# Patient Record
Sex: MALE | Race: WHITE | NOT HISPANIC OR LATINO
[De-identification: names, ages, dates, MRNs, and addresses within clinical notes are randomized per-mention and may not be internally consistent; named-entity substitution may affect disease eponyms.]

---

## 2018-10-05 PROBLEM — Z00.00 ENCOUNTER FOR PREVENTIVE HEALTH EXAMINATION: Status: ACTIVE | Noted: 2018-10-05

## 2018-10-06 ENCOUNTER — RX RENEWAL (OUTPATIENT)
Age: 33
End: 2018-10-06

## 2018-10-06 ENCOUNTER — APPOINTMENT (OUTPATIENT)
Dept: INTERNAL MEDICINE | Facility: CLINIC | Age: 33
End: 2018-10-06
Payer: COMMERCIAL

## 2018-10-06 ENCOUNTER — LABORATORY RESULT (OUTPATIENT)
Age: 33
End: 2018-10-06

## 2018-10-06 VITALS
DIASTOLIC BLOOD PRESSURE: 80 MMHG | WEIGHT: 210 LBS | HEART RATE: 77 BPM | SYSTOLIC BLOOD PRESSURE: 120 MMHG | TEMPERATURE: 98.4 F | HEIGHT: 69 IN | OXYGEN SATURATION: 98 % | BODY MASS INDEX: 31.1 KG/M2

## 2018-10-06 DIAGNOSIS — F17.200 NICOTINE DEPENDENCE, UNSPECIFIED, UNCOMPLICATED: ICD-10-CM

## 2018-10-06 DIAGNOSIS — Z78.9 OTHER SPECIFIED HEALTH STATUS: ICD-10-CM

## 2018-10-06 DIAGNOSIS — J30.1 ALLERGIC RHINITIS DUE TO POLLEN: ICD-10-CM

## 2018-10-06 DIAGNOSIS — Z82.49 FAMILY HISTORY OF ISCHEMIC HEART DISEASE AND OTHER DISEASES OF THE CIRCULATORY SYSTEM: ICD-10-CM

## 2018-10-06 DIAGNOSIS — F10.230 ALCOHOL DEPENDENCE WITH WITHDRAWAL, UNCOMPLICATED: ICD-10-CM

## 2018-10-06 PROCEDURE — 36415 COLL VENOUS BLD VENIPUNCTURE: CPT

## 2018-10-06 PROCEDURE — 99203 OFFICE O/P NEW LOW 30 MIN: CPT | Mod: 25

## 2018-10-06 RX ORDER — LORAZEPAM 1 MG/1
1 TABLET ORAL
Qty: 15 | Refills: 0 | Status: DISCONTINUED | COMMUNITY
Start: 2018-10-06 | End: 2018-10-06

## 2018-10-06 NOTE — PLAN
[FreeTextEntry1] : Gave pt written instructions on how to taper lorazepam\par Follow up at [facility] outpatient program.

## 2018-10-06 NOTE — HISTORY OF PRESENT ILLNESS
[FreeTextEntry8] : Pt referred from [facility] drinking 6-15 drinks daily, one shot per drink, for the past 2 weeks. Prior to 2 weeks ago, pt was drinking every other day for about 6 weeks. Prior to 2 months ago, pt was sober for 1 year. Pt has never been in Detox or Rehab. Pt had initial evaluation at [facility] yesterday,

## 2018-10-06 NOTE — PHYSICAL EXAM

## 2018-10-10 LAB
ALBUMIN SERPL ELPH-MCNC: 4.7 G/DL
ALP BLD-CCNC: 95 U/L
ALT SERPL-CCNC: 31 U/L
ANION GAP SERPL CALC-SCNC: 13 MMOL/L
APPEARANCE: ABNORMAL
AST SERPL-CCNC: 24 U/L
BASOPHILS # BLD AUTO: 0.05 K/UL
BASOPHILS NFR BLD AUTO: 0.8 %
BILIRUB SERPL-MCNC: 0.4 MG/DL
BILIRUBIN URINE: NEGATIVE
BLOOD URINE: NEGATIVE
BUN SERPL-MCNC: 14 MG/DL
CALCIUM SERPL-MCNC: 9.5 MG/DL
CHLORIDE SERPL-SCNC: 101 MMOL/L
CHOLEST SERPL-MCNC: 218 MG/DL
CHOLEST/HDLC SERPL: 5.1 RATIO
CO2 SERPL-SCNC: 26 MMOL/L
COLOR: YELLOW
CREAT SERPL-MCNC: 1.09 MG/DL
EOSINOPHIL # BLD AUTO: 0.31 K/UL
EOSINOPHIL NFR BLD AUTO: 4.9 %
ESTIMATED AVERAGE GLUCOSE: 111 MG/DL
FOLATE SERPL-MCNC: 9.1 NG/ML
GLUCOSE QUALITATIVE U: NEGATIVE MG/DL
GLUCOSE SERPL-MCNC: 98 MG/DL
HBA1C MFR BLD HPLC: 5.5 %
HCT VFR BLD CALC: 44.2 %
HDLC SERPL-MCNC: 43 MG/DL
HGB BLD-MCNC: 14.2 G/DL
IMM GRANULOCYTES NFR BLD AUTO: 0.2 %
KETONES URINE: NEGATIVE
LDLC SERPL CALC-MCNC: 138 MG/DL
LEUKOCYTE ESTERASE URINE: NEGATIVE
LYMPHOCYTES # BLD AUTO: 2.07 K/UL
LYMPHOCYTES NFR BLD AUTO: 32.8 %
M TB IFN-G BLD-IMP: NEGATIVE
MAN DIFF?: NORMAL
MCHC RBC-ENTMCNC: 28.5 PG
MCHC RBC-ENTMCNC: 32.1 GM/DL
MCV RBC AUTO: 88.8 FL
MONOCYTES # BLD AUTO: 0.45 K/UL
MONOCYTES NFR BLD AUTO: 7.1 %
NEUTROPHILS # BLD AUTO: 3.43 K/UL
NEUTROPHILS NFR BLD AUTO: 54.2 %
NITRITE URINE: NEGATIVE
PH URINE: 7
PLATELET # BLD AUTO: 322 K/UL
POTASSIUM SERPL-SCNC: 4.3 MMOL/L
PROT SERPL-MCNC: 7.1 G/DL
PROTEIN URINE: NEGATIVE MG/DL
PSA SERPL-MCNC: 0.35 NG/ML
QUANTIFERON TB PLUS MITOGEN MINUS NIL: >10 IU/ML
QUANTIFERON TB PLUS NIL: 0.03 IU/ML
QUANTIFERON TB PLUS TB1 MINUS NIL: -0.01 IU/ML
QUANTIFERON TB PLUS TB2 MINUS NIL: -0.01 IU/ML
RBC # BLD: 4.98 M/UL
RBC # FLD: 13.7 %
SODIUM SERPL-SCNC: 140 MMOL/L
SPECIFIC GRAVITY URINE: 1.03
TRIGL SERPL-MCNC: 186 MG/DL
TSH SERPL-ACNC: 3.93 UIU/ML
UROBILINOGEN URINE: NEGATIVE MG/DL
VIT B12 SERPL-MCNC: 705 PG/ML
WBC # FLD AUTO: 6.32 K/UL

## 2019-01-09 ENCOUNTER — APPOINTMENT (OUTPATIENT)
Dept: INTERNAL MEDICINE | Facility: CLINIC | Age: 34
End: 2019-01-09
Payer: COMMERCIAL

## 2019-01-09 VITALS
TEMPERATURE: 97.9 F | BODY MASS INDEX: 32.44 KG/M2 | DIASTOLIC BLOOD PRESSURE: 80 MMHG | SYSTOLIC BLOOD PRESSURE: 128 MMHG | HEIGHT: 69 IN | WEIGHT: 219 LBS

## 2019-01-09 DIAGNOSIS — F10.20 ALCOHOL DEPENDENCE, UNCOMPLICATED: ICD-10-CM

## 2019-01-09 DIAGNOSIS — F41.8 OTHER SPECIFIED ANXIETY DISORDERS: ICD-10-CM

## 2019-01-09 PROCEDURE — 99214 OFFICE O/P EST MOD 30 MIN: CPT

## 2019-01-09 NOTE — REVIEW OF SYSTEMS
[Insomnia] : insomnia [Anxiety] : anxiety [Depression] : depression [Negative] : Neurological [Suicidal] : not suicidal

## 2019-01-09 NOTE — PHYSICAL EXAM
[No Acute Distress] : no acute distress [Normal Sclera/Conjunctiva] : normal sclera/conjunctiva [Normal Outer Ear/Nose] : the outer ears and nose were normal in appearance [Normal Gait] : normal gait [No Focal Deficits] : no focal deficits [Normal Affect] : the affect was normal [Alert and Oriented x3] : oriented to person, place, and time [Normal Insight/Judgement] : insight and judgment were intact

## 2019-01-09 NOTE — HISTORY OF PRESENT ILLNESS
[FreeTextEntry8] : He started drinking again about 3 months ago. He wants to try antabuse. He was able to quit before by going to AA. He wants to stop for a year which he did and then just restarted. he thinks that it is worsened by depression and anxiety. he is seeing a therapist. He has good days and bad days. overall he lacks motivation. his sleep is variable. His wife notices the changes in him

## 2019-01-09 NOTE — ASSESSMENT
[FreeTextEntry1] : have referred him to the [facility] re: the antabuse. he should reconsider going back to AA. Will start him on lexapro. he will continue to see his therapist\par return 4-6 weeks

## 2019-01-09 NOTE — HEALTH RISK ASSESSMENT
[3] : 1) Little interest or pleasure doing things for nearly every day (3) [2] : 2) Feeling down, depressed, or hopeless for more than half of the days (2) [] : No [[id number]] : 5

## 2019-01-22 ENCOUNTER — CLINICAL ADVICE (OUTPATIENT)
Age: 34
End: 2019-01-22

## 2020-01-03 ENCOUNTER — APPOINTMENT (OUTPATIENT)
Dept: INTERNAL MEDICINE | Facility: CLINIC | Age: 35
End: 2020-01-03

## 2022-12-02 ENCOUNTER — APPOINTMENT (OUTPATIENT)
Dept: INTERNAL MEDICINE | Facility: CLINIC | Age: 37
End: 2022-12-02

## 2023-03-14 ENCOUNTER — INPATIENT (INPATIENT)
Facility: HOSPITAL | Age: 38
LOS: 6 days | Discharge: HOME | DRG: 897 | End: 2023-03-21
Attending: HOSPITALIST | Admitting: HOSPITALIST
Payer: COMMERCIAL

## 2023-03-14 VITALS
RESPIRATION RATE: 20 BRPM | SYSTOLIC BLOOD PRESSURE: 144 MMHG | HEART RATE: 111 BPM | WEIGHT: 235.01 LBS | TEMPERATURE: 99 F | HEIGHT: 69 IN | DIASTOLIC BLOOD PRESSURE: 93 MMHG

## 2023-03-14 DIAGNOSIS — F10.239 ALCOHOL DEPENDENCE WITH WITHDRAWAL, UNSPECIFIED: ICD-10-CM

## 2023-03-14 DIAGNOSIS — Z29.9 ENCOUNTER FOR PROPHYLACTIC MEASURES, UNSPECIFIED: ICD-10-CM

## 2023-03-14 DIAGNOSIS — R19.7 DIARRHEA, UNSPECIFIED: ICD-10-CM

## 2023-03-14 DIAGNOSIS — F41.9 ANXIETY DISORDER, UNSPECIFIED: ICD-10-CM

## 2023-03-14 LAB
ALBUMIN SERPL ELPH-MCNC: 4.1 G/DL — SIGNIFICANT CHANGE UP (ref 3.3–5)
ALBUMIN SERPL ELPH-MCNC: 4.3 G/DL — SIGNIFICANT CHANGE UP (ref 3.3–5)
ALP SERPL-CCNC: 207 U/L — HIGH (ref 40–120)
ALP SERPL-CCNC: 210 U/L — HIGH (ref 40–120)
ALT FLD-CCNC: 100 U/L — HIGH (ref 10–45)
ALT FLD-CCNC: 95 U/L — HIGH (ref 10–45)
AMPHET UR-MCNC: NEGATIVE — SIGNIFICANT CHANGE UP
ANION GAP SERPL CALC-SCNC: 13 MMOL/L — SIGNIFICANT CHANGE UP (ref 5–17)
ANION GAP SERPL CALC-SCNC: 15 MMOL/L — SIGNIFICANT CHANGE UP (ref 5–17)
ANISOCYTOSIS BLD QL: SLIGHT — SIGNIFICANT CHANGE UP
APAP SERPL-MCNC: <15 UG/ML — SIGNIFICANT CHANGE UP (ref 10–30)
APPEARANCE UR: ABNORMAL
APTT BLD: 35.9 SEC — HIGH (ref 27.5–35.5)
AST SERPL-CCNC: 211 U/L — HIGH (ref 10–40)
AST SERPL-CCNC: 212 U/L — HIGH (ref 10–40)
BACTERIA # UR AUTO: NEGATIVE — SIGNIFICANT CHANGE UP
BARBITURATES UR SCN-MCNC: NEGATIVE — SIGNIFICANT CHANGE UP
BASOPHILS # BLD AUTO: 0 K/UL — SIGNIFICANT CHANGE UP (ref 0–0.2)
BASOPHILS NFR BLD AUTO: 0 % — SIGNIFICANT CHANGE UP (ref 0–2)
BENZODIAZ UR-MCNC: NEGATIVE — SIGNIFICANT CHANGE UP
BILIRUB DIRECT SERPL-MCNC: 1.1 MG/DL — HIGH (ref 0–0.3)
BILIRUB INDIRECT FLD-MCNC: 1.6 MG/DL — HIGH (ref 0.2–1)
BILIRUB SERPL-MCNC: 2.7 MG/DL — HIGH (ref 0.2–1.2)
BILIRUB SERPL-MCNC: 3.7 MG/DL — HIGH (ref 0.2–1.2)
BILIRUB UR-MCNC: ABNORMAL
BUN SERPL-MCNC: 5 MG/DL — LOW (ref 7–23)
BUN SERPL-MCNC: <4 MG/DL — LOW (ref 7–23)
CALCIUM SERPL-MCNC: 9.4 MG/DL — SIGNIFICANT CHANGE UP (ref 8.4–10.5)
CALCIUM SERPL-MCNC: 9.5 MG/DL — SIGNIFICANT CHANGE UP (ref 8.4–10.5)
CHLORIDE SERPL-SCNC: 95 MMOL/L — LOW (ref 96–108)
CHLORIDE SERPL-SCNC: 97 MMOL/L — SIGNIFICANT CHANGE UP (ref 96–108)
CO2 SERPL-SCNC: 23 MMOL/L — SIGNIFICANT CHANGE UP (ref 22–31)
CO2 SERPL-SCNC: 26 MMOL/L — SIGNIFICANT CHANGE UP (ref 22–31)
COCAINE METAB.OTHER UR-MCNC: NEGATIVE — SIGNIFICANT CHANGE UP
COLOR SPEC: ABNORMAL
CREAT SERPL-MCNC: 0.7 MG/DL — SIGNIFICANT CHANGE UP (ref 0.5–1.3)
CREAT SERPL-MCNC: 0.8 MG/DL — SIGNIFICANT CHANGE UP (ref 0.5–1.3)
DIFF PNL FLD: NEGATIVE — SIGNIFICANT CHANGE UP
EGFR: 118 ML/MIN/1.73M2 — SIGNIFICANT CHANGE UP
EGFR: 122 ML/MIN/1.73M2 — SIGNIFICANT CHANGE UP
EOSINOPHIL # BLD AUTO: 0.14 K/UL — SIGNIFICANT CHANGE UP (ref 0–0.5)
EOSINOPHIL NFR BLD AUTO: 0.9 % — SIGNIFICANT CHANGE UP (ref 0–6)
EPI CELLS # UR: 1 /HPF — SIGNIFICANT CHANGE UP
ETHANOL SERPL-MCNC: 14 MG/DL — HIGH (ref 0–10)
GI PCR PANEL: SIGNIFICANT CHANGE UP
GLUCOSE SERPL-MCNC: 110 MG/DL — HIGH (ref 70–99)
GLUCOSE SERPL-MCNC: 88 MG/DL — SIGNIFICANT CHANGE UP (ref 70–99)
GLUCOSE UR QL: NEGATIVE — SIGNIFICANT CHANGE UP
HCT VFR BLD CALC: 42.8 % — SIGNIFICANT CHANGE UP (ref 39–50)
HCT VFR BLD CALC: 45.1 % — SIGNIFICANT CHANGE UP (ref 39–50)
HGB BLD-MCNC: 14.5 G/DL — SIGNIFICANT CHANGE UP (ref 13–17)
HGB BLD-MCNC: 15 G/DL — SIGNIFICANT CHANGE UP (ref 13–17)
HYALINE CASTS # UR AUTO: ABNORMAL /LPF
INR BLD: 1.56 RATIO — HIGH (ref 0.88–1.16)
KETONES UR-MCNC: SIGNIFICANT CHANGE UP
LEUKOCYTE ESTERASE UR-ACNC: NEGATIVE — SIGNIFICANT CHANGE UP
LYMPHOCYTES # BLD AUTO: 13.9 % — SIGNIFICANT CHANGE UP (ref 13–44)
LYMPHOCYTES # BLD AUTO: 2.13 K/UL — SIGNIFICANT CHANGE UP (ref 1–3.3)
MACROCYTES BLD QL: SLIGHT — SIGNIFICANT CHANGE UP
MAGNESIUM SERPL-MCNC: 1.7 MG/DL — SIGNIFICANT CHANGE UP (ref 1.6–2.6)
MANUAL SMEAR VERIFICATION: SIGNIFICANT CHANGE UP
MCHC RBC-ENTMCNC: 32.4 PG — SIGNIFICANT CHANGE UP (ref 27–34)
MCHC RBC-ENTMCNC: 32.7 PG — SIGNIFICANT CHANGE UP (ref 27–34)
MCHC RBC-ENTMCNC: 33.3 GM/DL — SIGNIFICANT CHANGE UP (ref 32–36)
MCHC RBC-ENTMCNC: 33.9 GM/DL — SIGNIFICANT CHANGE UP (ref 32–36)
MCV RBC AUTO: 96.6 FL — SIGNIFICANT CHANGE UP (ref 80–100)
MCV RBC AUTO: 97.4 FL — SIGNIFICANT CHANGE UP (ref 80–100)
METHADONE UR-MCNC: NEGATIVE — SIGNIFICANT CHANGE UP
MONOCYTES # BLD AUTO: 1.99 K/UL — HIGH (ref 0–0.9)
MONOCYTES NFR BLD AUTO: 13 % — SIGNIFICANT CHANGE UP (ref 2–14)
NEUTROPHILS # BLD AUTO: 11.08 K/UL — HIGH (ref 1.8–7.4)
NEUTROPHILS NFR BLD AUTO: 68.7 % — SIGNIFICANT CHANGE UP (ref 43–77)
NEUTS BAND # BLD: 3.5 % — SIGNIFICANT CHANGE UP (ref 0–8)
NITRITE UR-MCNC: NEGATIVE — SIGNIFICANT CHANGE UP
NRBC # BLD: 0 /100 WBCS — SIGNIFICANT CHANGE UP (ref 0–0)
OPIATES UR-MCNC: NEGATIVE — SIGNIFICANT CHANGE UP
OVALOCYTES BLD QL SMEAR: SLIGHT — SIGNIFICANT CHANGE UP
OXYCODONE UR-MCNC: NEGATIVE — SIGNIFICANT CHANGE UP
PCP SPEC-MCNC: SIGNIFICANT CHANGE UP
PCP UR-MCNC: NEGATIVE — SIGNIFICANT CHANGE UP
PH UR: 6.5 — SIGNIFICANT CHANGE UP (ref 5–8)
PHOSPHATE SERPL-MCNC: 3.3 MG/DL — SIGNIFICANT CHANGE UP (ref 2.5–4.5)
PLAT MORPH BLD: NORMAL — SIGNIFICANT CHANGE UP
PLATELET # BLD AUTO: 265 K/UL — SIGNIFICANT CHANGE UP (ref 150–400)
PLATELET # BLD AUTO: 273 K/UL — SIGNIFICANT CHANGE UP (ref 150–400)
PLATELET COUNT - ESTIMATE: NORMAL — SIGNIFICANT CHANGE UP
POLYCHROMASIA BLD QL SMEAR: SLIGHT — SIGNIFICANT CHANGE UP
POTASSIUM SERPL-MCNC: 3.8 MMOL/L — SIGNIFICANT CHANGE UP (ref 3.5–5.3)
POTASSIUM SERPL-MCNC: 4.1 MMOL/L — SIGNIFICANT CHANGE UP (ref 3.5–5.3)
POTASSIUM SERPL-SCNC: 3.8 MMOL/L — SIGNIFICANT CHANGE UP (ref 3.5–5.3)
POTASSIUM SERPL-SCNC: 4.1 MMOL/L — SIGNIFICANT CHANGE UP (ref 3.5–5.3)
PROT SERPL-MCNC: 7.7 G/DL — SIGNIFICANT CHANGE UP (ref 6–8.3)
PROT SERPL-MCNC: 8.1 G/DL — SIGNIFICANT CHANGE UP (ref 6–8.3)
PROT UR-MCNC: ABNORMAL
PROTHROM AB SERPL-ACNC: 18 SEC — HIGH (ref 10.5–13.4)
RBC # BLD: 4.43 M/UL — SIGNIFICANT CHANGE UP (ref 4.2–5.8)
RBC # BLD: 4.63 M/UL — SIGNIFICANT CHANGE UP (ref 4.2–5.8)
RBC # FLD: 14.6 % — HIGH (ref 10.3–14.5)
RBC # FLD: 14.7 % — HIGH (ref 10.3–14.5)
RBC BLD AUTO: SIGNIFICANT CHANGE UP
RBC CASTS # UR COMP ASSIST: 1 /HPF — SIGNIFICANT CHANGE UP (ref 0–4)
SALICYLATES SERPL-MCNC: <2 MG/DL — LOW (ref 15–30)
SARS-COV-2 RNA SPEC QL NAA+PROBE: SIGNIFICANT CHANGE UP
SODIUM SERPL-SCNC: 134 MMOL/L — LOW (ref 135–145)
SODIUM SERPL-SCNC: 135 MMOL/L — SIGNIFICANT CHANGE UP (ref 135–145)
SP GR SPEC: 1.03 — HIGH (ref 1.01–1.02)
TARGETS BLD QL SMEAR: SLIGHT — SIGNIFICANT CHANGE UP
THC UR QL: NEGATIVE — SIGNIFICANT CHANGE UP
TSH SERPL-MCNC: 8.28 UIU/ML — HIGH (ref 0.27–4.2)
UROBILINOGEN FLD QL: ABNORMAL
WBC # BLD: 13.41 K/UL — HIGH (ref 3.8–10.5)
WBC # BLD: 15.34 K/UL — HIGH (ref 3.8–10.5)
WBC # FLD AUTO: 13.41 K/UL — HIGH (ref 3.8–10.5)
WBC # FLD AUTO: 15.34 K/UL — HIGH (ref 3.8–10.5)
WBC UR QL: 5 /HPF — SIGNIFICANT CHANGE UP (ref 0–5)

## 2023-03-14 PROCEDURE — 99285 EMERGENCY DEPT VISIT HI MDM: CPT

## 2023-03-14 PROCEDURE — 99222 1ST HOSP IP/OBS MODERATE 55: CPT

## 2023-03-14 RX ORDER — ACETAMINOPHEN 500 MG
650 TABLET ORAL EVERY 6 HOURS
Refills: 0 | Status: DISCONTINUED | OUTPATIENT
Start: 2023-03-14 | End: 2023-03-21

## 2023-03-14 RX ORDER — DIAZEPAM 5 MG
5 TABLET ORAL ONCE
Refills: 0 | Status: DISCONTINUED | OUTPATIENT
Start: 2023-03-14 | End: 2023-03-14

## 2023-03-14 RX ORDER — ONDANSETRON 8 MG/1
4 TABLET, FILM COATED ORAL ONCE
Refills: 0 | Status: COMPLETED | OUTPATIENT
Start: 2023-03-14 | End: 2023-03-14

## 2023-03-14 RX ORDER — ONDANSETRON 8 MG/1
4 TABLET, FILM COATED ORAL EVERY 8 HOURS
Refills: 0 | Status: DISCONTINUED | OUTPATIENT
Start: 2023-03-14 | End: 2023-03-21

## 2023-03-14 RX ORDER — LANOLIN ALCOHOL/MO/W.PET/CERES
3 CREAM (GRAM) TOPICAL AT BEDTIME
Refills: 0 | Status: DISCONTINUED | OUTPATIENT
Start: 2023-03-14 | End: 2023-03-21

## 2023-03-14 RX ORDER — SODIUM CHLORIDE 9 MG/ML
1000 INJECTION, SOLUTION INTRAVENOUS
Refills: 0 | Status: DISCONTINUED | OUTPATIENT
Start: 2023-03-14 | End: 2023-03-20

## 2023-03-14 RX ORDER — AZITHROMYCIN 500 MG/1
500 TABLET, FILM COATED ORAL DAILY
Refills: 0 | Status: DISCONTINUED | OUTPATIENT
Start: 2023-03-14 | End: 2023-03-15

## 2023-03-14 RX ORDER — SODIUM CHLORIDE 9 MG/ML
1000 INJECTION, SOLUTION INTRAVENOUS ONCE
Refills: 0 | Status: COMPLETED | OUTPATIENT
Start: 2023-03-14 | End: 2023-03-14

## 2023-03-14 RX ORDER — FOLIC ACID 0.8 MG
1 TABLET ORAL DAILY
Refills: 0 | Status: DISCONTINUED | OUTPATIENT
Start: 2023-03-14 | End: 2023-03-21

## 2023-03-14 RX ORDER — THIAMINE MONONITRATE (VIT B1) 100 MG
500 TABLET ORAL DAILY
Refills: 0 | Status: COMPLETED | OUTPATIENT
Start: 2023-03-15 | End: 2023-03-16

## 2023-03-14 RX ORDER — NICOTINE POLACRILEX 2 MG
2 GUM BUCCAL
Refills: 0 | Status: DISCONTINUED | OUTPATIENT
Start: 2023-03-14 | End: 2023-03-21

## 2023-03-14 RX ORDER — NICOTINE POLACRILEX 2 MG
1 GUM BUCCAL DAILY
Refills: 0 | Status: DISCONTINUED | OUTPATIENT
Start: 2023-03-14 | End: 2023-03-21

## 2023-03-14 RX ORDER — INFLUENZA VIRUS VACCINE 15; 15; 15; 15 UG/.5ML; UG/.5ML; UG/.5ML; UG/.5ML
0.5 SUSPENSION INTRAMUSCULAR ONCE
Refills: 0 | Status: DISCONTINUED | OUTPATIENT
Start: 2023-03-14 | End: 2023-03-21

## 2023-03-14 RX ORDER — THIAMINE MONONITRATE (VIT B1) 100 MG
500 TABLET ORAL ONCE
Refills: 0 | Status: COMPLETED | OUTPATIENT
Start: 2023-03-14 | End: 2023-03-14

## 2023-03-14 RX ADMIN — Medication 1 MILLIGRAM(S): at 14:06

## 2023-03-14 RX ADMIN — ONDANSETRON 4 MILLIGRAM(S): 8 TABLET, FILM COATED ORAL at 05:55

## 2023-03-14 RX ADMIN — Medication 2 MILLIGRAM(S): at 13:02

## 2023-03-14 RX ADMIN — Medication 4 MILLIGRAM(S): at 21:22

## 2023-03-14 RX ADMIN — Medication 2 MILLIGRAM(S): at 14:17

## 2023-03-14 RX ADMIN — AZITHROMYCIN 500 MILLIGRAM(S): 500 TABLET, FILM COATED ORAL at 14:06

## 2023-03-14 RX ADMIN — Medication 1 PATCH: at 19:09

## 2023-03-14 RX ADMIN — Medication 1 PATCH: at 14:05

## 2023-03-14 RX ADMIN — ONDANSETRON 4 MILLIGRAM(S): 8 TABLET, FILM COATED ORAL at 08:56

## 2023-03-14 RX ADMIN — Medication 105 MILLIGRAM(S): at 05:22

## 2023-03-14 RX ADMIN — Medication 5 MILLIGRAM(S): at 05:58

## 2023-03-14 RX ADMIN — SODIUM CHLORIDE 1000 MILLILITER(S): 9 INJECTION, SOLUTION INTRAVENOUS at 04:04

## 2023-03-14 RX ADMIN — SODIUM CHLORIDE 100 MILLILITER(S): 9 INJECTION, SOLUTION INTRAVENOUS at 21:23

## 2023-03-14 RX ADMIN — Medication 4 MILLIGRAM(S): at 17:33

## 2023-03-14 RX ADMIN — Medication 5 MILLIGRAM(S): at 04:05

## 2023-03-14 RX ADMIN — SODIUM CHLORIDE 100 MILLILITER(S): 9 INJECTION, SOLUTION INTRAVENOUS at 14:03

## 2023-03-14 NOTE — ED PROVIDER NOTE - ATTENDING CONTRIBUTION TO CARE
37-year-old with history of anxiety and alcohol use disorder comes in secondary to anxiety and waking throughout the night and having difficulty sleeping secondary to his anxiety.  Patient states his last drink was yesterday around 4 PM.  Patient usually drinks 500 to 600 mL of vodka each day.  Patient without gross SI or HI but states when he wakes up throughout the night with his anxiety at times he thinks about SI without any plan.    Anxious  Moderate distress secondary to situation  Tachycardic  Nontachypneic  Soft, no tenderness no rebound or guarding   no rash petechiae or vesicles  Moisés Sheikh MD, FACEP: In this physician's medical judgement based on clinical history and physical exam the patient's signs and symptoms lead to differential diagnoses which includes but is not limited to: Alcohol withdrawal, anxiety, stress reaction    Historical signs and symptoms not consistent with derek, paranoia, suicidal homicidal intent.    Labs were ordered and independently reviewed by me.  EKG was ordered and independently reviewed by me.  Imaging was ordered and reviewed by me.    Appropriate medications for the patient's presenting complaints were ordered, and effects were reassessed.    Patient's records including prior hospital visit, med and medical history were reviewed.    Escalation to admission/observation was considered.    Will follow up on labs, therapeutics, imaging, reassess and disposition as clinically indicated.  *The above represents an initial assessment/impression. Please refer to my progress notes below for potential changes in patient clinical course*

## 2023-03-14 NOTE — H&P ADULT - HISTORY OF PRESENT ILLNESS
37M w/pmh alcohol use disorder and anxiety presents to [facility] for alcohol withdrawal. He has been drinking 500-600ml vodka daily for the past 6 months. He was sober prior to this, where he had a work trip which started the drinking again. He has a history of alcohol overdose where he presented to the hospital before but has never been admitted to the ICU or had a withdrawal seizure. He currently endorses severe anxiety, insomnia. He drinks to try to get himself to fall asleep. He wakes up at night with what sound like panic attacks. He currently denies suicidal ideation but has felt it before. He was supposed to go to a rehab facility in [place] sometime this week, pending bed availability. Last drink 3/13 ~4pm.    Patient also has been having diarrhea, about 6 episodes so far today, which started last night. Before that he was having nausea and vomiting for half a week (his wife had the same), not sure what he ate to set off his symptoms. Has not felt febrile. Currently has nausea, last vomited yesterday. The diarrhea is very loose/watery, yellow. No blood.

## 2023-03-14 NOTE — H&P ADULT - PROBLEM SELECTOR PLAN 4
-low improve score, no chemical dvt ppx  -regular diet    Nicotine use disorder - discussed with patient he should quit smoking, he agrees. WIll give nicotine patch and lozenges prn cravings. 7 minutes spent on counseling with patient. -low improve score, no chemical dvt ppx  -regular diet    Nicotine use disorder - discussed with patient he should quit smoking, he agrees. WIll give nicotine patch and lozenges prn cravings. 7 minutes spent on counseling with patient.    Elevated TSH >8 -- check free T4

## 2023-03-14 NOTE — ED BEHAVIORAL HEALTH ASSESSMENT NOTE - HPI (INCLUDE ILLNESS QUALITY, SEVERITY, DURATION, TIMING, CONTEXT, MODIFYING FACTORS, ASSOCIATED SIGNS AND SYMPTOMS)
Patient is a 37 year old male, currently living at home with wife and 2 dogs, working as an  for insurance company [employer] with history of depression, anxiety and alcohol abuse, previously on several SSRIs including fluoxetine (unsure of name of all), lithium - all discontinued now, has seen psychiatrist in the past but stopped a year ago, with PMHx of stomach ulcer (treated with sucralfate), current smoker for the last 15 years, half a pack a day, history of alcohol overdose 6 years ago - quit drinking for 2 years and restart 6 months ago about 500ml - 600ml of vodka everyday.    On interview, patient is cooperative A&Ox4, stating he has been having exacerbated sleeping issues for the past 3 months. Pt reports he's been sleeping for approximately 3 hrs each night and states he feels like he has "racing thoughts" when trying to sleep. Pt also reports feeling depressed and anxious, more so in the last 3 months. Pt states he felt like it was getting to a point "where I couldn't do it anymore" so he came into the ER with parents, and wife. Pt also reports vomiting nearly every day for the last 1.5 weeks. Pt states he has seen psychiatrist in the past but reports "it wasn't a good fit" so he stopped going. Reports being on SSRIs in the past but states after a couple of months, he didn't feel like it was helping so he voluntarily stopped taking SSRIs. Unsure of all names of SSRIs, states he tried fluoxetine and "it didn't work." Reports having reactions to some SSRIs but is unable to recall names. States sleeping has been an issues in the past, tried melatonin with no resolution, also reports taking Trazadone which "helped a little."  Pt admits to struggling with thoughts of "being a failure and not good enough." States this stems from having a image of perfectionism set upon him when he was younger. Reports recent stress at work also exacerbating anxiety, depression and drinking. States he enjoys work but sometimes "goals are not being met" which makes intensifies his struggles of feeling like a failure. States anxiety is worse during 3am - 8am, and more so on Sundays because it's the day before he has to restart working. Reports history of hospitalization due to alcohol overdose 6 years. Pt sates he quit for 2 years and then restarted again 6 months ago, drinking about 500ml - 600ml of vodka daily. Reports last drink being 4pm yesterday. States he has tried quitting smoking in the past (reports using ZYN) but was unsuccessful. Reports not being able to stop drinking and smoking also exacerbating feeling like a failure.     States he has thoughts at night of "it would be better without him." Denies active suicidal ideation, plans or attempts. Denies being a threat to himself or others. Denies access to weapons. Denies safety issues. Denies paranoia. Denies any trauma in the past. Denies family history of depression, bipolar, schizophrenia.     Collateral info: dad, Chris Goetz, present at bedside. [facility] pt was brought in at 1am because he has been restless in the last few days. [facility] pt was voluntarily scheduled to report to [facility] in [place] today "for depression" but ended up coming to the ER because of restlessness. [facility] pt felt anxious last night and was vomiting. Dad was unaware pt restarted drinking alcohol after rehab, found out today after coming to the ED. Dad lives in [place] so he does not see pt every day, who lives at home with wife in [place]. Reports pt went to rehab [facility] in [place] "about 3 years ago."

## 2023-03-14 NOTE — ED BEHAVIORAL HEALTH ASSESSMENT NOTE - DESCRIPTION
see HPI. Ativan 0.5mg q12hrs PRN  Cooperative in the ED upon interview    Vital Signs Last 24 Hrs  T(C): 36.3 (14 Mar 2023 13:05), Max: 37.4 (14 Mar 2023 03:36)  T(F): 97.3 (14 Mar 2023 13:05), Max: 99.3 (14 Mar 2023 03:36)  HR: 102 (14 Mar 2023 15:24) (102 - 116)  BP: 141/90 (14 Mar 2023 15:24) (136/90 - 149/98)  BP(mean): 94 (14 Mar 2023 07:08) (94 - 98)  RR: 17 (14 Mar 2023 15:24) (17 - 20)  SpO2: 96% (14 Mar 2023 15:24) (94% - 96%)    Parameters below as of 14 Mar 2023 15:24  Patient On (Oxygen Delivery Method): room air

## 2023-03-14 NOTE — PATIENT PROFILE ADULT - FALL HARM RISK - HARM RISK INTERVENTIONS

## 2023-03-14 NOTE — ED PROVIDER NOTE - OBJECTIVE STATEMENT
37-year-old male history of self-reported anxiety and EtOH abuse here for anxiety causing him to have SI.  He has had bad anxiety for the last 3 months.  He said in the last 6 months daily 10 drinks of alcohol a day.  Last drink was 4 PM last night.  States that his anxiety started building up and he was afraid he was going to have suicidal thoughts.  He has no plan and no access to firearms.  He has never had a previous attempt.  Currently here at time evaluation he has no SI.  He is requesting to see a psychiatrist because he is on no medications for his anxiety.  Additionally he has no other medical complaints.  He does states that when he is extremely anxious he cannot eat or drink secondary to nausea.

## 2023-03-14 NOTE — ED PROVIDER NOTE - PROGRESS NOTE DETAILS
SATURNINO Abraham (PGY-3) - Labs nonactionable, patient is calm and vital signs have improved.  We will continue to monitor placed on CIWA, continue constant obs.  Called telepsych for consultation and was told to have day team call psych liaison for consultation inpatient.  Discussed with hospitalist.

## 2023-03-14 NOTE — ED PROVIDER NOTE - PHYSICAL EXAMINATION
On examination patient is in no acute distress, skin appears flushed but normothermic, some corrective horizontal nystagmus, slight tremors of extended arms.  Regular rhythm but tacky, clear lungs, nontender abdomen.

## 2023-03-14 NOTE — ED BEHAVIORAL HEALTH ASSESSMENT NOTE - NSBHATTESTCOMMENTATTENDFT_PSY_A_CORE
Patient is a 37 year old man, currently living at home with his wife no children, working as an  for insurance company [employer] with history of depression, anxiety and alcohol abuse, previously on several SSRIs including fluoxetine (unsure of name of all), lithium - all discontinued now, has seen psychiatrist in the past but stopped a year ago, with PMHx of stomach ulcer (treated with sucralfate), current smoker for the last 15 years, half a pack a day, history of alcohol dependence- quit drinking for 2 years and restarted 6 months ago about 500ml - 600ml of vodka everyday.  PT has no thoughts of harming himself now and his parents who later arrived at bedside deny any past suicidal attempts.  Pt is very tremulous and in acute alcohol withdrawal and has a high tolerance to alcohol and benzodiazepines.  He may benefit from having a high dose Ativan 3mg po q3hrs or if LFTS improve Librium 100mg po q4hrs.  Pt and his family plan for inpatient alcohol rehab once he is cleared medically.

## 2023-03-14 NOTE — H&P ADULT - PROBLEM SELECTOR PLAN 1
-add on hepatic functional panel to check LFTs - if elevated will get RUQ US  -ativan taper and prn ativan   -SW consult - patient was scheduled to go to rehab/detox center this week before he got admitted  -500mg thiamine x3 days  -folic acid 1mg qd

## 2023-03-14 NOTE — PATIENT PROFILE ADULT - SW.
[de-identified] : 75 year old male with HTN, CAD s/p CABG, RHD s/p MVR on Coumadin, T2DM, Afib, Rosie Thompson tears and esophageal ulcers here for follow up. \par \par Spoke with pt and daughter over phone. Has not been taking all urology medications as prescribed. Only takes finasteride as be believes it is the only med that works now. Only has to get up once per night to urinate. When he does, he feels like he is emptying his bladder. No dysuria, frequency, hematuria noted. Follows with cardio as well. States he is going to see dentist next week for cleaning.  
social work

## 2023-03-14 NOTE — ED BEHAVIORAL HEALTH ASSESSMENT NOTE - SUMMARY
Patient is a 37 year old male, currently living at home with wife and 2 dogs, working as an  for insurance company [employer] with history of depression, anxiety and alcohol abuse, previously on several SSRIs including fluoxetine (unsure of name of all), lithium - all discontinued now, has seen psychiatrist in the past but stopped a year ago, with PMHx of stomach ulcer (treated with sucralfate), current smoker for the last 15 years, half a pack a day, history of alcohol overdose 6 years ago - quit drinking for 2 years and restart 6 months ago about 500ml - 600ml of vodka everyday.    On interview, patient is cooperative A&Ox4, stating he has been having exacerbated sleeping issues for the past 3 months. Pt reports he's been sleeping for approximately 3 hrs each night and states he feels like he has "racing thoughts" when trying to sleep. Pt also reports feeling depressed and anxious, more so in the last 3 months. Pt states he felt like it was getting to a point "where I couldn't do it anymore" so he came into the ER with parents, and wife. Pt also reports vomiting nearly every day for the last 1.5 weeks. Pt states he has seen psychiatrist in the past but reports "it wasn't a good fit" so he stopped going. Reports being on SSRIs in the past but states after a couple of months, he didn't feel like it was helping so he voluntarily stopped taking SSRIs. Unsure of all names of SSRIs, states he tried fluoxetine and "it didn't work." Reports having reactions to some SSRIs but is unable to recall names. States sleeping has been an issues in the past, tried melatonin with no resolution, also reports taking Trazadone which "helped a little."  Pt admits to struggling with thoughts of "being a failure and not good enough." States this stems from having a image of perfectionism set upon him when he was younger. Reports recent stress at work also exacerbating anxiety, depression and drinking. States he enjoys work but sometimes "goals are not being met" which makes intensifies his struggles of feeling like a failure. States anxiety is worse during 3am - 8am, and more so on Sundays because it's the day before he has to restart working. Reports history of hospitalization due to alcohol overdose 6 years. Pt sates he quit for 2 years and then restarted again 6 months ago, drinking about 500ml - 600ml of vodka daily. Reports last drink being 4pm yesterday. States he has tried quitting smoking in the past (reports using ZYN) but was unsuccessful. Reports not being able to stop drinking and smoking also exacerbating feeling like a failure.     States he has thoughts at night of "it would be better without him." Denies active suicidal ideation, plans or attempts. Denies being a threat to himself or others. Denies access to weapons. Denies safety issues. Denies paranoia. Denies any trauma in the past. Denies family history of depression, bipolar, schizophrenia. Patient is a 37 year old man, currently living at home with his wife and 2 dogs, working as an  for insurance company [employer] with history of depression, anxiety and alcohol abuse, previously on several SSRIs including fluoxetine (unsure of name of all), lithium - all discontinued now, has seen psychiatrist in the past but stopped a year ago, with PMHx of stomach ulcer (treated with sucralfate), current smoker for the last 15 years, half a pack a day, history of alcohol dependence- quit drinking for 2 years and restarted 6 months ago about 500ml - 600ml of vodka everyday.    On interview, patient is cooperative A&Ox4, stating he has been having exacerbated sleeping issues for the past 3 months. Pt reports he's been sleeping for approximately 3 hrs each night and states he feels like he has "racing thoughts" when trying to sleep. Pt also reports feeling depressed and anxious, more so in the last 3 months. Pt states he felt like it was getting to a point "where I couldn't do it anymore" so he came into the ER with parents, and wife. Pt also reports vomiting nearly every day for the last 1.5 weeks. Pt states he has seen psychiatrist in the past but reports "it wasn't a good fit" so he stopped going. Reports being on SSRIs in the past but states after a couple of months, he didn't feel like it was helping so he voluntarily stopped taking SSRIs. Unsure of all names of SSRIs, states he tried fluoxetine and "it didn't work." Reports having reactions to some SSRIs but is unable to recall names. States sleeping has been an issues in the past, tried melatonin with no resolution, also reports taking Trazodone which "helped a little."  Pt admits to struggling with thoughts of "being a failure and not good enough." States this stems from having a image of perfectionism set upon him when he was younger. Reports recent stress at work also exacerbating anxiety, depression and drinking. States he enjoys work but sometimes "goals are not being met" which makes intensifies his struggles of feeling like a failure. States anxiety is worse during 3am - 8am, and more so on Sundays because it's the day before he has to restart working. Reports history of hospitalization due to alcohol overdose 6 years. Pt sates he quit for 2 years and then restarted again 6 months ago, drinking about 500ml - 600ml of vodka daily. Reports last drink being 4pm yesterday. States he has tried quitting smoking in the past (reports using ZYN) but was unsuccessful. Reports not being able to stop drinking and smoking also exacerbating feeling like a failure.     States he has thoughts at night of "it would be better without him." Denies active suicidal ideation, plans or attempts. Denies being a threat to himself or others. Denies access to weapons. Denies safety issues. Denies paranoia. Denies any trauma in the past. Denies family history of depression, bipolar, schizophrenia.

## 2023-03-14 NOTE — ED ADULT TRIAGE NOTE - CHIEF COMPLAINT QUOTE
anxiety for several months. SI ideation. anxiety for several months. SI ideation. Hx of alcohol abuse.

## 2023-03-14 NOTE — H&P ADULT - NSHPPHYSICALEXAM_GEN_ALL_CORE
Vital Signs Last 24 Hrs  T(C): 36.3 (14 Mar 2023 13:05), Max: 37.4 (14 Mar 2023 03:36)  T(F): 97.3 (14 Mar 2023 13:05), Max: 99.3 (14 Mar 2023 03:36)  HR: 104 (14 Mar 2023 13:05) (102 - 116)  BP: 144/92 (14 Mar 2023 13:05) (136/90 - 149/98)  BP(mean): 94 (14 Mar 2023 07:08) (94 - 98)  RR: 19 (14 Mar 2023 13:05) (17 - 20)  SpO2: 95% (14 Mar 2023 13:05) (94% - 96%)    Parameters below as of 14 Mar 2023 13:05  Patient On (Oxygen Delivery Method): room air        CONSTITUTIONAL: Well-groomed, in no apparent distress  EYES: No conjunctival or scleral injection, non-icteric; PERRLA and symmetric  ENMT: No external nasal lesions; no pharyngeal injection or exudates, oral mucosa with moist membranes  NECK: Trachea midline without palpable neck mass; thyroid not enlarged and non-tender  RESPIRATORY: Breathing comfortably; lungs CTA without wheeze/rhonchi/rales  CARDIOVASCULAR: +S1S2, RRR, no M/G/R; pedal pulses full and symmetric; no lower extremity edema  GASTROINTESTINAL: No palpable masses or tenderness, +BS throughout, no rebound/guarding; no hepatosplenomegaly; +distended  MUSCULOSKELETAL: no digital clubbing or cyanosis; no paraspinal tenderness; normal strength and tone of extremities  SKIN: No rashes or ulcers noted; no subcutaneous nodules or induration palpable  NEUROLOGIC: CN II-XII intact; sensation intact in LEs b/l to light touch  PSYCHIATRIC: A+O x 3; mood and affect appropriate; appropriate insight and judgment Vital Signs Last 24 Hrs  T(C): 36.3 (14 Mar 2023 13:05), Max: 37.4 (14 Mar 2023 03:36)  T(F): 97.3 (14 Mar 2023 13:05), Max: 99.3 (14 Mar 2023 03:36)  HR: 104 (14 Mar 2023 13:05) (102 - 116)  BP: 144/92 (14 Mar 2023 13:05) (136/90 - 149/98)  BP(mean): 94 (14 Mar 2023 07:08) (94 - 98)  RR: 19 (14 Mar 2023 13:05) (17 - 20)  SpO2: 95% (14 Mar 2023 13:05) (94% - 96%)    Parameters below as of 14 Mar 2023 13:05  Patient On (Oxygen Delivery Method): room air        CONSTITUTIONAL: Well-groomed, in no apparent distress  EYES: No conjunctival or scleral injection, non-icteric; PERRLA and symmetric  ENMT: No external nasal lesions; no pharyngeal injection or exudates, oral mucosa with moist membranes  NECK: Trachea midline without palpable neck mass; thyroid not enlarged and non-tender  RESPIRATORY: Breathing comfortably; lungs CTA without wheeze/rhonchi/rales  CARDIOVASCULAR: +S1S2, RRR, no M/G/R; pedal pulses full and symmetric; no lower extremity edema  GASTROINTESTINAL: No palpable masses or tenderness, +BS throughout, no rebound/guarding; no hepatosplenomegaly; +distended  MUSCULOSKELETAL: no digital clubbing or cyanosis; no paraspinal tenderness; normal strength and tone of extremities  SKIN: No rashes or ulcers noted; no subcutaneous nodules or induration palpable  NEUROLOGIC: CN II-XII intact; sensation intact in LEs b/l to light touch. No tongue fasciculations. +tremors with outstretched arms.  PSYCHIATRIC: A+O x 3; mood and affect appropriate; appropriate insight and judgment

## 2023-03-14 NOTE — ED PROVIDER NOTE - CARE PLAN
Principal Discharge DX:	Alcohol dependence with withdrawal  Secondary Diagnosis:	Anxiety  Secondary Diagnosis:	Feeling suicidal   1

## 2023-03-14 NOTE — ED PROVIDER NOTE - CLINICAL SUMMARY MEDICAL DECISION MAKING FREE TEXT BOX
Patient here for likely generalized anxiety disorder but has not fully been diagnosed for him.  I do believe that his SI secondary to his uncontrolled anxiety.  I also do not believe that his SI is active at this time.  He will need a psychiatrist evaluation to be initiated on medications for his anxiety.  We will keep constant ops for now.  Additionally given that he is a daily alcohol user and his current vital signs and tremors support a mild withdrawal picture with a RASS of +1, I will treat withdrawal with Valium.  Patient will need a medicine admission until he is clear of his withdrawal.

## 2023-03-14 NOTE — H&P ADULT - ASSESSMENT
37M w/pmh alcohol use disorder presents to [facility] with alcohol withdrawal and diarrhea.

## 2023-03-14 NOTE — ED ADULT NURSE NOTE - OBJECTIVE STATEMENT
36 y/o male c/o anxiety x a couple months and having SI in the last 24 hours. Upon initial assessment, patient denying current SI/HI and admits to being stressed out and anxious for a couple of months due to the increased stress at work. Patient does not have a plan, no hallucinations. As per patient he currently drinks approximately 10 drinks of vodka per day. States he has been drinking this quanity for the last couple of months. Has never gone through withdrawals and has never needed medical intervention for his ETOH abuse. Upon assessment, patient walking the halls and appears anxious. Breathing unlabored and spontaneous on RA, sweat visibly present on forehead. Denies any other medical problems. VSS. IV inserted and labs sent. Patient belongings taken by security, patient was wanded. Valuable belongings were taken and locked in the safe.

## 2023-03-14 NOTE — H&P ADULT - NSHPREVIEWOFSYSTEMS_GEN_ALL_CORE
REVIEW OF SYSTEMS:    CONSTITUTIONAL: No weakness, weight loss, fevers or chills  EYES/ENT: No visual changes;  No vertigo or throat pain   NECK: No pain or stiffness  RESPIRATORY: No cough, wheezing, hemoptysis; No shortness of breath  CARDIOVASCULAR: No chest pain or palpitations, VENEGAS  GASTROINTESTINAL: No abdominal or epigastric pain. +nausea, vomiting, no hematemesis; +diarrhea. No melena or hematochezia.  GENITOURINARY: No dysuria, frequency or hematuria  NEUROLOGICAL: No numbness or weakness, AAOX3  SKIN: No itching, rashes. +facial flushing  MUSCULOSKELETAL: no joint erythema, no joint swelling  PSYCHIATRIC: +depression, +anxiety

## 2023-03-14 NOTE — H&P ADULT - NSHPSOCIALHISTORY_GEN_ALL_CORE
drinks 500-600ml vodka a daily for the past 6 months, was sober prior to that, but has had issues with drinking alcohol in the past too. Denies other drug use. Smokes 1/2 ppd for 15 years.

## 2023-03-14 NOTE — H&P ADULT - PROBLEM SELECTOR PLAN 2
-Meets sepsis criteria with leukocytosis, tachycardia  -check GI PCR  -since he p/w sepsis will give azithromycin x3 days  -give 1L IVF to correct for fluid losses, volume depletion

## 2023-03-14 NOTE — ED ADULT TRIAGE NOTE - WILL THE PATIENT ACCEPT THE PFIZER COVID-19 VACCINE IF ELIGIBLE AND IT IS AVAILABLE?
RECEIVED PT FROM [facility] PT IS AAOX4 AMBULATORY IV ON LEFT HAND INFUSING WELL ON 
TELEMETRY SR  DENIES ANY PAINI OR DISCOMFORT  INITIAL ASSESSMENT DONE No

## 2023-03-15 DIAGNOSIS — R74.01 ELEVATION OF LEVELS OF LIVER TRANSAMINASE LEVELS: ICD-10-CM

## 2023-03-15 LAB
ALBUMIN SERPL ELPH-MCNC: 3.6 G/DL — SIGNIFICANT CHANGE UP (ref 3.3–5)
ALP SERPL-CCNC: 166 U/L — HIGH (ref 40–120)
ALT FLD-CCNC: 75 U/L — HIGH (ref 10–45)
ANION GAP SERPL CALC-SCNC: 12 MMOL/L — SIGNIFICANT CHANGE UP (ref 5–17)
ANION GAP SERPL CALC-SCNC: 14 MMOL/L — SIGNIFICANT CHANGE UP (ref 5–17)
AST SERPL-CCNC: 149 U/L — HIGH (ref 10–40)
BASOPHILS # BLD AUTO: 0.15 K/UL — SIGNIFICANT CHANGE UP (ref 0–0.2)
BASOPHILS NFR BLD AUTO: 1.5 % — SIGNIFICANT CHANGE UP (ref 0–2)
BILIRUB SERPL-MCNC: 3.5 MG/DL — HIGH (ref 0.2–1.2)
BUN SERPL-MCNC: 6 MG/DL — LOW (ref 7–23)
BUN SERPL-MCNC: 6 MG/DL — LOW (ref 7–23)
CALCIUM SERPL-MCNC: 8.7 MG/DL — SIGNIFICANT CHANGE UP (ref 8.4–10.5)
CALCIUM SERPL-MCNC: 8.9 MG/DL — SIGNIFICANT CHANGE UP (ref 8.4–10.5)
CHLORIDE SERPL-SCNC: 96 MMOL/L — SIGNIFICANT CHANGE UP (ref 96–108)
CHLORIDE SERPL-SCNC: 97 MMOL/L — SIGNIFICANT CHANGE UP (ref 96–108)
CO2 SERPL-SCNC: 24 MMOL/L — SIGNIFICANT CHANGE UP (ref 22–31)
CO2 SERPL-SCNC: 26 MMOL/L — SIGNIFICANT CHANGE UP (ref 22–31)
CREAT SERPL-MCNC: 0.8 MG/DL — SIGNIFICANT CHANGE UP (ref 0.5–1.3)
CREAT SERPL-MCNC: 0.81 MG/DL — SIGNIFICANT CHANGE UP (ref 0.5–1.3)
EGFR: 117 ML/MIN/1.73M2 — SIGNIFICANT CHANGE UP
EGFR: 118 ML/MIN/1.73M2 — SIGNIFICANT CHANGE UP
EOSINOPHIL # BLD AUTO: 0.49 K/UL — SIGNIFICANT CHANGE UP (ref 0–0.5)
EOSINOPHIL NFR BLD AUTO: 4.9 % — SIGNIFICANT CHANGE UP (ref 0–6)
GLUCOSE SERPL-MCNC: 68 MG/DL — LOW (ref 70–99)
GLUCOSE SERPL-MCNC: 85 MG/DL — SIGNIFICANT CHANGE UP (ref 70–99)
HCT VFR BLD CALC: 39.6 % — SIGNIFICANT CHANGE UP (ref 39–50)
HGB BLD-MCNC: 13.3 G/DL — SIGNIFICANT CHANGE UP (ref 13–17)
IMM GRANULOCYTES NFR BLD AUTO: 0.5 % — SIGNIFICANT CHANGE UP (ref 0–0.9)
LYMPHOCYTES # BLD AUTO: 2.5 K/UL — SIGNIFICANT CHANGE UP (ref 1–3.3)
LYMPHOCYTES # BLD AUTO: 25.1 % — SIGNIFICANT CHANGE UP (ref 13–44)
MAGNESIUM SERPL-MCNC: 1.6 MG/DL — SIGNIFICANT CHANGE UP (ref 1.6–2.6)
MAGNESIUM SERPL-MCNC: 1.7 MG/DL — SIGNIFICANT CHANGE UP (ref 1.6–2.6)
MCHC RBC-ENTMCNC: 33.1 PG — SIGNIFICANT CHANGE UP (ref 27–34)
MCHC RBC-ENTMCNC: 33.6 GM/DL — SIGNIFICANT CHANGE UP (ref 32–36)
MCV RBC AUTO: 98.5 FL — SIGNIFICANT CHANGE UP (ref 80–100)
MONOCYTES # BLD AUTO: 1.11 K/UL — HIGH (ref 0–0.9)
MONOCYTES NFR BLD AUTO: 11.1 % — SIGNIFICANT CHANGE UP (ref 2–14)
NEUTROPHILS # BLD AUTO: 5.68 K/UL — SIGNIFICANT CHANGE UP (ref 1.8–7.4)
NEUTROPHILS NFR BLD AUTO: 56.9 % — SIGNIFICANT CHANGE UP (ref 43–77)
NRBC # BLD: 0 /100 WBCS — SIGNIFICANT CHANGE UP (ref 0–0)
PHOSPHATE SERPL-MCNC: 3.4 MG/DL — SIGNIFICANT CHANGE UP (ref 2.5–4.5)
PHOSPHATE SERPL-MCNC: 3.5 MG/DL — SIGNIFICANT CHANGE UP (ref 2.5–4.5)
PLATELET # BLD AUTO: 227 K/UL — SIGNIFICANT CHANGE UP (ref 150–400)
POTASSIUM SERPL-MCNC: 3.5 MMOL/L — SIGNIFICANT CHANGE UP (ref 3.5–5.3)
POTASSIUM SERPL-MCNC: 3.7 MMOL/L — SIGNIFICANT CHANGE UP (ref 3.5–5.3)
POTASSIUM SERPL-SCNC: 3.5 MMOL/L — SIGNIFICANT CHANGE UP (ref 3.5–5.3)
POTASSIUM SERPL-SCNC: 3.7 MMOL/L — SIGNIFICANT CHANGE UP (ref 3.5–5.3)
PROT SERPL-MCNC: 6.7 G/DL — SIGNIFICANT CHANGE UP (ref 6–8.3)
RBC # BLD: 4.02 M/UL — LOW (ref 4.2–5.8)
RBC # FLD: 14.6 % — HIGH (ref 10.3–14.5)
SODIUM SERPL-SCNC: 134 MMOL/L — LOW (ref 135–145)
SODIUM SERPL-SCNC: 135 MMOL/L — SIGNIFICANT CHANGE UP (ref 135–145)
T4 FREE SERPL-MCNC: 1.4 NG/DL — SIGNIFICANT CHANGE UP (ref 0.9–1.8)
WBC # BLD: 9.98 K/UL — SIGNIFICANT CHANGE UP (ref 3.8–10.5)
WBC # FLD AUTO: 9.98 K/UL — SIGNIFICANT CHANGE UP (ref 3.8–10.5)

## 2023-03-15 PROCEDURE — 99232 SBSQ HOSP IP/OBS MODERATE 35: CPT

## 2023-03-15 PROCEDURE — 76705 ECHO EXAM OF ABDOMEN: CPT | Mod: 26

## 2023-03-15 RX ADMIN — Medication 1 MILLIGRAM(S): at 13:49

## 2023-03-15 RX ADMIN — Medication 1 PATCH: at 13:46

## 2023-03-15 RX ADMIN — Medication 1 PATCH: at 09:33

## 2023-03-15 RX ADMIN — Medication 4 MILLIGRAM(S): at 01:06

## 2023-03-15 RX ADMIN — Medication 4 MILLIGRAM(S): at 05:15

## 2023-03-15 RX ADMIN — Medication 4 MILLIGRAM(S): at 10:10

## 2023-03-15 RX ADMIN — Medication 3 MILLIGRAM(S): at 14:52

## 2023-03-15 RX ADMIN — Medication 105 MILLIGRAM(S): at 13:45

## 2023-03-15 RX ADMIN — Medication 1 MILLIGRAM(S): at 20:12

## 2023-03-15 RX ADMIN — Medication 4 MILLIGRAM(S): at 22:43

## 2023-03-15 RX ADMIN — Medication 3 MILLIGRAM(S): at 18:34

## 2023-03-15 RX ADMIN — Medication 1 PATCH: at 07:35

## 2023-03-15 RX ADMIN — Medication 1 PATCH: at 19:20

## 2023-03-15 NOTE — SBIRT NOTE ADULT - NSSBIRTALCACTIVEREFTXDET_GEN_A_CORE
Patient reports being set up for [facility] for inpatient treatment for tomorrow, 9 AM. He reports family has notified [facility] of delay in admission. Patient interested in doing such once he is discharged from the hospital.

## 2023-03-16 DIAGNOSIS — K70.30 ALCOHOLIC CIRRHOSIS OF LIVER WITHOUT ASCITES: ICD-10-CM

## 2023-03-16 LAB
ALBUMIN SERPL ELPH-MCNC: 3.7 G/DL — SIGNIFICANT CHANGE UP (ref 3.3–5)
ALP SERPL-CCNC: 165 U/L — HIGH (ref 40–120)
ALT FLD-CCNC: 69 U/L — HIGH (ref 10–45)
ANION GAP SERPL CALC-SCNC: 14 MMOL/L — SIGNIFICANT CHANGE UP (ref 5–17)
AST SERPL-CCNC: 139 U/L — HIGH (ref 10–40)
BILIRUB SERPL-MCNC: 3.6 MG/DL — HIGH (ref 0.2–1.2)
BUN SERPL-MCNC: 7 MG/DL — SIGNIFICANT CHANGE UP (ref 7–23)
CALCIUM SERPL-MCNC: 9 MG/DL — SIGNIFICANT CHANGE UP (ref 8.4–10.5)
CHLORIDE SERPL-SCNC: 100 MMOL/L — SIGNIFICANT CHANGE UP (ref 96–108)
CO2 SERPL-SCNC: 24 MMOL/L — SIGNIFICANT CHANGE UP (ref 22–31)
CREAT SERPL-MCNC: 0.76 MG/DL — SIGNIFICANT CHANGE UP (ref 0.5–1.3)
EGFR: 119 ML/MIN/1.73M2 — SIGNIFICANT CHANGE UP
GLUCOSE SERPL-MCNC: 73 MG/DL — SIGNIFICANT CHANGE UP (ref 70–99)
POTASSIUM SERPL-MCNC: 3.6 MMOL/L — SIGNIFICANT CHANGE UP (ref 3.5–5.3)
POTASSIUM SERPL-SCNC: 3.6 MMOL/L — SIGNIFICANT CHANGE UP (ref 3.5–5.3)
PROT SERPL-MCNC: 6.8 G/DL — SIGNIFICANT CHANGE UP (ref 6–8.3)
SODIUM SERPL-SCNC: 138 MMOL/L — SIGNIFICANT CHANGE UP (ref 135–145)

## 2023-03-16 PROCEDURE — 99233 SBSQ HOSP IP/OBS HIGH 50: CPT

## 2023-03-16 PROCEDURE — 99222 1ST HOSP IP/OBS MODERATE 55: CPT

## 2023-03-16 PROCEDURE — 93975 VASCULAR STUDY: CPT | Mod: 26

## 2023-03-16 RX ORDER — CHLORHEXIDINE GLUCONATE 213 G/1000ML
1 SOLUTION TOPICAL DAILY
Refills: 0 | Status: DISCONTINUED | OUTPATIENT
Start: 2023-03-16 | End: 2023-03-21

## 2023-03-16 RX ADMIN — Medication 4 MILLIGRAM(S): at 02:06

## 2023-03-16 RX ADMIN — Medication 1 PATCH: at 13:06

## 2023-03-16 RX ADMIN — Medication 3 MILLIGRAM(S): at 18:13

## 2023-03-16 RX ADMIN — Medication 1 PATCH: at 07:45

## 2023-03-16 RX ADMIN — Medication 4 MILLIGRAM(S): at 10:16

## 2023-03-16 RX ADMIN — Medication 4 MILLIGRAM(S): at 06:16

## 2023-03-16 RX ADMIN — Medication 105 MILLIGRAM(S): at 13:09

## 2023-03-16 RX ADMIN — Medication 1 MILLIGRAM(S): at 13:07

## 2023-03-16 RX ADMIN — Medication 4 MILLIGRAM(S): at 14:25

## 2023-03-16 RX ADMIN — Medication 3 MILLIGRAM(S): at 22:07

## 2023-03-16 RX ADMIN — CHLORHEXIDINE GLUCONATE 1 APPLICATION(S): 213 SOLUTION TOPICAL at 13:08

## 2023-03-16 NOTE — CONSULT NOTE ADULT - ASSESSMENT
Incomplete note 37 year old male, PMH alcohol use disorder presenting with [facility] with alcohol withdrawal. Hepatology consulted for alcoholic cirrhosis.     #EtOH Cirrhosis   -HCC: no lesions on US 3/15  -HE: no evidence on exam   -Meld-Na 20 on admission   -needs to quit drinking, per patient plan for rehab after this hospitalization.    37 year old male, PMH alcohol use disorder presenting with [facility] with alcohol withdrawal. Hepatology consulted for alcoholic cirrhosis.     #EtOH Cirrhosis   -Meld-Na 20 on admission  -HCC: no lesions on US 3/15  -HE: no evidence on exam   -Meld-Na 20 on admission   -needs to quit drinking, per patient plan for rehab after this hospitalization.     Incomplete note    37 year old male, PMH alcohol use disorder presenting with [facility] with alcohol withdrawal. Hepatology consulted for alcoholic cirrhosis.     #EtOH Cirrhosis   -Meld-Na 20 on admission  -HCC: no lesions on US 3/15/23  -HE: no evidence on exam   -Ascites: none on US 3/15/23  -needs to quit drinking, per patient plan for rehab after this hospitalization.     Incomplete note    37 year old male, PMH alcohol use disorder presenting with [facility] with alcohol withdrawal. Hepatology consulted for alcoholic hepatitis.   Though US w/ evidence of cirrhosis, unable to diagnose as liver inflamed ISO acute hepatitis.     #EtOH Hepatitis   -Maddrey's DF 31  -no role for steroids at this time, however, will need to monitor labs over the next few days.   -nutritional support- low sodium, high protein diet   -patient counseled on necessity for alcohol cessation, planning on going to rehab after hospitalization   -will need to FU w/ hepatology after discharge, repeat imaging to evaluate for extent of fibrosis   -obtain MRI while inpatient

## 2023-03-16 NOTE — CONSULT NOTE ADULT - SUBJECTIVE AND OBJECTIVE BOX
Chief Complaint:  Patient is a 37y old  Male who presents with a chief complaint of alcohol withdrawal (15 Mar 2023 14:15)      HPI:    Allergies:  No Known Allergies      Home Medications:    Hospital Medications:  acetaminophen     Tablet .. 650 milliGRAM(s) Oral every 6 hours PRN  aluminum hydroxide/magnesium hydroxide/simethicone Suspension 30 milliLiter(s) Oral every 4 hours PRN  chlorhexidine 2% Cloths 1 Application(s) Topical daily  folic acid 1 milliGRAM(s) Oral daily  influenza   Vaccine 0.5 milliLiter(s) IntraMuscular once  lactated ringers. 1000 milliLiter(s) IV Continuous <Continuous>  LORazepam     Tablet 2 milliGRAM(s) Oral every 1 hour PRN  LORazepam     Tablet   Oral   LORazepam     Tablet 4 milliGRAM(s) Oral every 4 hours  LORazepam     Tablet 3 milliGRAM(s) Oral every 4 hours  melatonin 3 milliGRAM(s) Oral at bedtime PRN  nicotine  Polacrilex Lozenge 2 milliGRAM(s) Oral every 2 hours PRN  nicotine -  14 mG/24Hr(s) Patch 1 Patch Transdermal daily  ondansetron Injectable 4 milliGRAM(s) IV Push every 8 hours PRN  thiamine IVPB 500 milliGRAM(s) IV Intermittent daily      PMHX/PSHX:  Alcohol use disorder    No significant past surgical history        Family history:  No pertinent family history in first degree relatives    FH: HTN (hypertension) (Mother)        Denies family history of colon cancer/polyps, stomach cancer/polyps, pancreatic cancer/masses, liver cancer/disease, ovarian cancer and endometrial cancer.    Social History:   Tob: Denies  EtOH: Denies  Illicit Drugs: Denies    ROS:     General:  No wt loss, fevers, chills, night sweats, fatigue  Eyes:  Good vision, no reported pain  ENT:  No sore throat, pain, runny nose, dysphagia  CV:  No pain, palpitations, hypo/hypertension  Pulm:  No dyspnea, cough, tachypnea, wheezing  GI:  see HPI  :  No pain, bleeding, incontinence, nocturia  Muscle:  No pain, weakness  Neuro:  No weakness, tingling, memory problems  Psych:  No fatigue, insomnia, mood problems, depression  Endocrine:  No polyuria, polydipsia, cold/heat intolerance  Heme:  No petechiae, ecchymosis, easy bruisability  Skin:  No rash, tattoos, scars, edema    PHYSICAL EXAM:     GENERAL:  No acute distress  HEENT:  NCAT, no scleral icterus   CHEST:  no respiratory distress  HEART:  Regular rate and rhythm  ABDOMEN:  Soft, non-tender, non-distended, normoactive bowel sounds,  no masses, no hepato-splenomegaly, no signs of chronic liver disease  EXTREMITIES: No edema  SKIN:  No rash/erythema/ecchymoses/petechiae/wounds/abscess/warm/dry  NEURO:  Alert and oriented x 3, no asterixis    Vital Signs:  Vital Signs Last 24 Hrs  T(C): 37.2 (16 Mar 2023 08:00), Max: 37.2 (16 Mar 2023 08:00)  T(F): 99 (16 Mar 2023 08:00), Max: 99 (16 Mar 2023 08:00)  HR: 115 (16 Mar 2023 08:00) (106 - 118)  BP: 131/92 (16 Mar 2023 08:00) (131/92 - 154/102)  BP(mean): --  RR: 18 (16 Mar 2023 08:00) (18 - 18)  SpO2: 92% (16 Mar 2023 08:00) (92% - 94%)    Parameters below as of 16 Mar 2023 08:00  Patient On (Oxygen Delivery Method): room air      Daily     Daily     LABS:                        13.3   9.98  )-----------( 227      ( 15 Mar 2023 06:56 )             39.6       03-16    138  |  100  |  7   ----------------------------<  73  3.6   |  24  |  0.76    Ca    9.0      16 Mar 2023 06:43  Phos  3.4     03-15  Mg     1.7     03-15    TPro  6.8  /  Alb  3.7  /  TBili  3.6<H>  /  DBili  x   /  AST  139<H>  /  ALT  69<H>  /  AlkPhos  165<H>  03-16    LIVER FUNCTIONS - ( 16 Mar 2023 06:43 )  Alb: 3.7 g/dL / Pro: 6.8 g/dL / ALK PHOS: 165 U/L / ALT: 69 U/L / AST: 139 U/L / GGT: x           PT/INR - ( 14 Mar 2023 15:14 )   PT: 18.0 sec;   INR: 1.56 ratio         PTT - ( 14 Mar 2023 15:14 )  PTT:35.9 sec                            13.3   9.98  )-----------( 227      ( 15 Mar 2023 06:56 )             39.6                         15.0   13.41 )-----------( 265      ( 14 Mar 2023 15:14 )             45.1                         14.5   15.34 )-----------( 273      ( 14 Mar 2023 03:55 )             42.8       Imaging:      < from: US Abdomen Doppler (03.16.23 @ 09:22) >  IMPRESSION:  Hepatofugal flow within the main, right, and left portal veins,   consistent with portal hypertension.    The hepatic veins are not well visualized.    --- End of Report ---      < end of copied text >  < from: US Abdomen Upper Quadrant Right (03.15.23 @ 13:21) >  IMPRESSION:  Cirrhosis and hepatomegaly.    Limited color flow evaluation of the portal vein reveals reversal flow,   concerning for portal hypertension. Dedicated complete Doppler abdominal   ultrasound can be ordered for further workup.    Mild perihepatic fluid.    --- End of Report ---    < end of copied text >       Chief Complaint:  Patient is a 37y old  Male who presents with a chief complaint of alcohol withdrawal (15 Mar 2023 14:15)      HPI:  37M w/pmh alcohol use disorder and anxiety presents to [facility] for alcohol withdrawal. He has been drinking 500-600ml vodka daily for the past 6 months. He was sober prior to this, where he had a work trip which started the drinking again. He has a history of alcohol overdose where he presented to the hospital before but has never been admitted to the ICU or had a withdrawal seizure. He currently endorses severe anxiety, insomnia. He drinks to try to get himself to fall asleep. He wakes up at night with what sound like panic attacks. He currently denies suicidal ideation but has felt it before. He was supposed to go to a rehab facility in [place] sometime this week, pending bed availability. Last drink 3/13 ~4pm.    Patient also has been having diarrhea, about 6 episodes so far today, which started last night. Before that he was having nausea and vomiting for half a week (his wife had the same), not sure what he ate to set off his symptoms. Has not felt febrile. Currently has nausea, last vomited yesterday. The diarrhea is very loose/watery, yellow. No blood.    Since admission, patient has been treated with ativan taper for withdrawal. Had RUQ US showing cirrhosis and hepatomegaly, as well as hepatofugal flow. Hepatology consulted for cirrhosis management.     Allergies:  No Known Allergies      Home Medications:    Hospital Medications:  acetaminophen     Tablet .. 650 milliGRAM(s) Oral every 6 hours PRN  aluminum hydroxide/magnesium hydroxide/simethicone Suspension 30 milliLiter(s) Oral every 4 hours PRN  chlorhexidine 2% Cloths 1 Application(s) Topical daily  folic acid 1 milliGRAM(s) Oral daily  influenza   Vaccine 0.5 milliLiter(s) IntraMuscular once  lactated ringers. 1000 milliLiter(s) IV Continuous <Continuous>  LORazepam     Tablet 2 milliGRAM(s) Oral every 1 hour PRN  LORazepam     Tablet   Oral   LORazepam     Tablet 4 milliGRAM(s) Oral every 4 hours  LORazepam     Tablet 3 milliGRAM(s) Oral every 4 hours  melatonin 3 milliGRAM(s) Oral at bedtime PRN  nicotine  Polacrilex Lozenge 2 milliGRAM(s) Oral every 2 hours PRN  nicotine -  14 mG/24Hr(s) Patch 1 Patch Transdermal daily  ondansetron Injectable 4 milliGRAM(s) IV Push every 8 hours PRN  thiamine IVPB 500 milliGRAM(s) IV Intermittent daily      PMHX/PSHX:  Alcohol use disorder    No significant past surgical history        Family history:  No pertinent family history in first degree relatives    FH: HTN (hypertension) (Mother)    Denies family history of colon cancer/polyps, stomach cancer/polyps, pancreatic cancer/masses, liver cancer/disease, ovarian cancer and endometrial cancer.    Social History:   Tob: Denies  EtOH: Denies  Illicit Drugs: Denies    ROS:   Per HPI      PHYSICAL EXAM:     GENERAL:  No acute distress  HEENT:  NCAT, no scleral icterus   CHEST:  no respiratory distress  HEART:  Regular rate and rhythm  ABDOMEN:  Soft, non-tender, non-distended, normoactive bowel sounds,  no masses, no hepato-splenomegaly, no signs of chronic liver disease  EXTREMITIES: No edema  SKIN:  No rash/erythema/ecchymoses/petechiae/wounds/abscess/warm/dry  NEURO:  Alert and oriented x 3, no asterixis    Vital Signs:  Vital Signs Last 24 Hrs  T(C): 37.2 (16 Mar 2023 08:00), Max: 37.2 (16 Mar 2023 08:00)  T(F): 99 (16 Mar 2023 08:00), Max: 99 (16 Mar 2023 08:00)  HR: 115 (16 Mar 2023 08:00) (106 - 118)  BP: 131/92 (16 Mar 2023 08:00) (131/92 - 154/102)  BP(mean): --  RR: 18 (16 Mar 2023 08:00) (18 - 18)  SpO2: 92% (16 Mar 2023 08:00) (92% - 94%)    Parameters below as of 16 Mar 2023 08:00  Patient On (Oxygen Delivery Method): room air      Daily     Daily     LABS:                        13.3   9.98  )-----------( 227      ( 15 Mar 2023 06:56 )             39.6       03-16    138  |  100  |  7   ----------------------------<  73  3.6   |  24  |  0.76    Ca    9.0      16 Mar 2023 06:43  Phos  3.4     03-15  Mg     1.7     03-15    TPro  6.8  /  Alb  3.7  /  TBili  3.6<H>  /  DBili  x   /  AST  139<H>  /  ALT  69<H>  /  AlkPhos  165<H>  03-16    LIVER FUNCTIONS - ( 16 Mar 2023 06:43 )  Alb: 3.7 g/dL / Pro: 6.8 g/dL / ALK PHOS: 165 U/L / ALT: 69 U/L / AST: 139 U/L / GGT: x           PT/INR - ( 14 Mar 2023 15:14 )   PT: 18.0 sec;   INR: 1.56 ratio         PTT - ( 14 Mar 2023 15:14 )  PTT:35.9 sec                            13.3   9.98  )-----------( 227      ( 15 Mar 2023 06:56 )             39.6                         15.0   13.41 )-----------( 265      ( 14 Mar 2023 15:14 )             45.1                         14.5   15.34 )-----------( 273      ( 14 Mar 2023 03:55 )             42.8       Imaging:      < from: US Abdomen Doppler (03.16.23 @ 09:22) >  IMPRESSION:  Hepatofugal flow within the main, right, and left portal veins,   consistent with portal hypertension.    The hepatic veins are not well visualized.    --- End of Report ---      < end of copied text >  < from: US Abdomen Upper Quadrant Right (03.15.23 @ 13:21) >  IMPRESSION:  Cirrhosis and hepatomegaly.    Limited color flow evaluation of the portal vein reveals reversal flow,   concerning for portal hypertension. Dedicated complete Doppler abdominal   ultrasound can be ordered for further workup.    Mild perihepatic fluid.    --- End of Report ---    < end of copied text >       Chief Complaint:  Patient is a 37y old  Male who presents with a chief complaint of alcohol withdrawal (15 Mar 2023 14:15)      HPI:  37M w/pmh alcohol use disorder and anxiety presents to [facility] for alcohol withdrawal. He has been drinking 500-600ml vodka daily for the past 6 months. He was sober prior to this, where he had a work trip which started the drinking again. He has a history of alcohol overdose where he presented to the hospital before but has never been admitted to the ICU or had a withdrawal seizure. He currently endorses severe anxiety, insomnia. He drinks to try to get himself to fall asleep. He wakes up at night with what sound like panic attacks. He currently denies suicidal ideation but has felt it before. He was supposed to go to a rehab facility in [place] sometime this week, pending bed availability. Last drink 3/13 ~4pm.    Patient also has been having diarrhea, about 6 episodes so far today, which started last night. Before that he was having nausea and vomiting for half a week (his wife had the same), not sure what he ate to set off his symptoms. Has not felt febrile. Currently has nausea, last vomited yesterday. The diarrhea is very loose/watery, yellow. No blood.    Since admission, patient has been treated with ativan taper for withdrawal, as well as thiamine 500mg x3 doses. Had RUQ US showing cirrhosis and hepatomegaly, as well as hepatofugal flow. Hepatology consulted for cirrhosis management.     Allergies:  No Known Allergies      Home Medications:    Hospital Medications:  acetaminophen     Tablet .. 650 milliGRAM(s) Oral every 6 hours PRN  aluminum hydroxide/magnesium hydroxide/simethicone Suspension 30 milliLiter(s) Oral every 4 hours PRN  chlorhexidine 2% Cloths 1 Application(s) Topical daily  folic acid 1 milliGRAM(s) Oral daily  influenza   Vaccine 0.5 milliLiter(s) IntraMuscular once  lactated ringers. 1000 milliLiter(s) IV Continuous <Continuous>  LORazepam     Tablet 2 milliGRAM(s) Oral every 1 hour PRN  LORazepam     Tablet   Oral   LORazepam     Tablet 4 milliGRAM(s) Oral every 4 hours  LORazepam     Tablet 3 milliGRAM(s) Oral every 4 hours  melatonin 3 milliGRAM(s) Oral at bedtime PRN  nicotine  Polacrilex Lozenge 2 milliGRAM(s) Oral every 2 hours PRN  nicotine -  14 mG/24Hr(s) Patch 1 Patch Transdermal daily  ondansetron Injectable 4 milliGRAM(s) IV Push every 8 hours PRN  thiamine IVPB 500 milliGRAM(s) IV Intermittent daily      PMHX/PSHX:  Alcohol use disorder    No significant past surgical history        Family history:  No pertinent family history in first degree relatives    FH: HTN (hypertension) (Mother)    Denies family history of colon cancer/polyps, stomach cancer/polyps, pancreatic cancer/masses, liver cancer/disease, ovarian cancer and endometrial cancer.    Social History:   Tob: Denies  EtOH: Denies  Illicit Drugs: Denies    ROS:   Per HPI      PHYSICAL EXAM:     GENERAL:  No acute distress  HEENT:  NCAT, no scleral icterus   CHEST:  no respiratory distress  HEART:  Regular rate and rhythm  ABDOMEN:  Soft, non-tender, non-distended, normoactive bowel sounds,  no masses, no hepato-splenomegaly, no signs of chronic liver disease  EXTREMITIES: No edema  SKIN:  No rash/erythema/ecchymoses/petechiae/wounds/abscess/warm/dry  NEURO:  Alert and oriented x 3, no asterixis    Vital Signs:  Vital Signs Last 24 Hrs  T(C): 37.2 (16 Mar 2023 08:00), Max: 37.2 (16 Mar 2023 08:00)  T(F): 99 (16 Mar 2023 08:00), Max: 99 (16 Mar 2023 08:00)  HR: 115 (16 Mar 2023 08:00) (106 - 118)  BP: 131/92 (16 Mar 2023 08:00) (131/92 - 154/102)  BP(mean): --  RR: 18 (16 Mar 2023 08:00) (18 - 18)  SpO2: 92% (16 Mar 2023 08:00) (92% - 94%)    Parameters below as of 16 Mar 2023 08:00  Patient On (Oxygen Delivery Method): room air      Daily     Daily     LABS:                        13.3   9.98  )-----------( 227      ( 15 Mar 2023 06:56 )             39.6       03-16    138  |  100  |  7   ----------------------------<  73  3.6   |  24  |  0.76    Ca    9.0      16 Mar 2023 06:43  Phos  3.4     03-15  Mg     1.7     03-15    TPro  6.8  /  Alb  3.7  /  TBili  3.6<H>  /  DBili  x   /  AST  139<H>  /  ALT  69<H>  /  AlkPhos  165<H>  03-16    LIVER FUNCTIONS - ( 16 Mar 2023 06:43 )  Alb: 3.7 g/dL / Pro: 6.8 g/dL / ALK PHOS: 165 U/L / ALT: 69 U/L / AST: 139 U/L / GGT: x           PT/INR - ( 14 Mar 2023 15:14 )   PT: 18.0 sec;   INR: 1.56 ratio         PTT - ( 14 Mar 2023 15:14 )  PTT:35.9 sec                            13.3   9.98  )-----------( 227      ( 15 Mar 2023 06:56 )             39.6                         15.0   13.41 )-----------( 265      ( 14 Mar 2023 15:14 )             45.1                         14.5   15.34 )-----------( 273      ( 14 Mar 2023 03:55 )             42.8       Imaging:      < from: US Abdomen Doppler (03.16.23 @ 09:22) >  IMPRESSION:  Hepatofugal flow within the main, right, and left portal veins,   consistent with portal hypertension.    The hepatic veins are not well visualized.    --- End of Report ---      < end of copied text >  < from: US Abdomen Upper Quadrant Right (03.15.23 @ 13:21) >  IMPRESSION:  Cirrhosis and hepatomegaly.    Limited color flow evaluation of the portal vein reveals reversal flow,   concerning for portal hypertension. Dedicated complete Doppler abdominal   ultrasound can be ordered for further workup.    Mild perihepatic fluid.    --- End of Report ---    < end of copied text >       Chief Complaint:  Patient is a 37y old  Male who presents with a chief complaint of alcohol withdrawal (15 Mar 2023 14:15)      HPI:  37M w/pmh alcohol use disorder and anxiety presents to [facility] for alcohol withdrawal. He has been drinking 500-600ml vodka daily for the past 6 months. He was sober prior to this, where he had a work trip which started the drinking again. He has a history of alcohol overdose where he presented to the hospital before but has never been admitted to the ICU or had a withdrawal seizure. He currently endorses severe anxiety, insomnia. He drinks to try to get himself to fall asleep. He wakes up at night with what sound like panic attacks. He currently denies suicidal ideation but has felt it before. He was supposed to go to a rehab facility in [place] sometime this week, pending bed availability. Last drink 3/13 ~4pm.    Patient also has been having diarrhea, about 6 episodes so far today, which started last night. Before that he was having nausea and vomiting for half a week (his wife had the same), not sure what he ate to set off his symptoms. Has not felt febrile. Currently has nausea, last vomited yesterday. The diarrhea is very loose/watery, yellow. No blood.    Since admission, patient has been treated with ativan taper for withdrawal, as well as thiamine 500mg x3 doses. Had RUQ US showing cirrhosis and hepatomegaly, as well as hepatofugal flow. Hepatology consulted for cirrhosis management.     Allergies:  No Known Allergies    Home Medications:    Hospital Medications:  acetaminophen     Tablet .. 650 milliGRAM(s) Oral every 6 hours PRN  aluminum hydroxide/magnesium hydroxide/simethicone Suspension 30 milliLiter(s) Oral every 4 hours PRN  chlorhexidine 2% Cloths 1 Application(s) Topical daily  folic acid 1 milliGRAM(s) Oral daily  influenza   Vaccine 0.5 milliLiter(s) IntraMuscular once  lactated ringers. 1000 milliLiter(s) IV Continuous <Continuous>  LORazepam     Tablet 2 milliGRAM(s) Oral every 1 hour PRN  LORazepam     Tablet   Oral   LORazepam     Tablet 4 milliGRAM(s) Oral every 4 hours  LORazepam     Tablet 3 milliGRAM(s) Oral every 4 hours  melatonin 3 milliGRAM(s) Oral at bedtime PRN  nicotine  Polacrilex Lozenge 2 milliGRAM(s) Oral every 2 hours PRN  nicotine -  14 mG/24Hr(s) Patch 1 Patch Transdermal daily  ondansetron Injectable 4 milliGRAM(s) IV Push every 8 hours PRN  thiamine IVPB 500 milliGRAM(s) IV Intermittent daily      PMHX/PSHX:  Alcohol use disorder    No significant past surgical history      Family history:  No pertinent family history in first degree relatives    FH: HTN (hypertension) (Mother)    Denies family history of colon cancer/polyps, stomach cancer/polyps, pancreatic cancer/masses, liver cancer/disease, ovarian cancer and endometrial cancer.    Social History:   Tob: smokes 1/2 PPD  EtOH: drinks 600mL vodka   Illicit Drugs: Denies    ROS:   Per HPI      PHYSICAL EXAM:     GENERAL:  No acute distress  HEENT:  NCAT, no scleral icterus   CHEST:  no respiratory distress  HEART:  Regular rate and rhythm  ABDOMEN:  distended, mildly tense. Non TTP.   EXTREMITIES: No edema  SKIN:  No rash/erythema/ecchymoses/petechiae/wounds/abscess/warm/dry  NEURO:  Alert and oriented x 3, no asterixis    Vital Signs:  Vital Signs Last 24 Hrs  T(C): 37.2 (16 Mar 2023 08:00), Max: 37.2 (16 Mar 2023 08:00)  T(F): 99 (16 Mar 2023 08:00), Max: 99 (16 Mar 2023 08:00)  HR: 115 (16 Mar 2023 08:00) (106 - 118)  BP: 131/92 (16 Mar 2023 08:00) (131/92 - 154/102)  BP(mean): --  RR: 18 (16 Mar 2023 08:00) (18 - 18)  SpO2: 92% (16 Mar 2023 08:00) (92% - 94%)    Parameters below as of 16 Mar 2023 08:00  Patient On (Oxygen Delivery Method): room air      Daily     Daily     LABS:                        13.3   9.98  )-----------( 227      ( 15 Mar 2023 06:56 )             39.6       03-16    138  |  100  |  7   ----------------------------<  73  3.6   |  24  |  0.76    Ca    9.0      16 Mar 2023 06:43  Phos  3.4     03-15  Mg     1.7     03-15    TPro  6.8  /  Alb  3.7  /  TBili  3.6<H>  /  DBili  x   /  AST  139<H>  /  ALT  69<H>  /  AlkPhos  165<H>  03-16    LIVER FUNCTIONS - ( 16 Mar 2023 06:43 )  Alb: 3.7 g/dL / Pro: 6.8 g/dL / ALK PHOS: 165 U/L / ALT: 69 U/L / AST: 139 U/L / GGT: x           PT/INR - ( 14 Mar 2023 15:14 )   PT: 18.0 sec;   INR: 1.56 ratio         PTT - ( 14 Mar 2023 15:14 )  PTT:35.9 sec                            13.3   9.98  )-----------( 227      ( 15 Mar 2023 06:56 )             39.6                         15.0   13.41 )-----------( 265      ( 14 Mar 2023 15:14 )             45.1                         14.5   15.34 )-----------( 273      ( 14 Mar 2023 03:55 )             42.8       Imaging:      < from: US Abdomen Doppler (03.16.23 @ 09:22) >  IMPRESSION:  Hepatofugal flow within the main, right, and left portal veins,   consistent with portal hypertension.    The hepatic veins are not well visualized.    --- End of Report ---      < end of copied text >  < from: US Abdomen Upper Quadrant Right (03.15.23 @ 13:21) >  IMPRESSION:  Cirrhosis and hepatomegaly.    Limited color flow evaluation of the portal vein reveals reversal flow,   concerning for portal hypertension. Dedicated complete Doppler abdominal   ultrasound can be ordered for further workup.    Mild perihepatic fluid.    --- End of Report ---    < end of copied text >       Chief Complaint:  Patient is a 37y old  Male who presents with a chief complaint of alcohol withdrawal (15 Mar 2023 14:15)      HPI:  37M w/pmh alcohol use disorder and anxiety presents to [facility] for alcohol withdrawal. He has been drinking 500-600ml vodka daily for the past 6 months. He was sober prior to this, where he had a work trip which started the drinking again. He has a history of alcohol overdose where he presented to the hospital before but has never been admitted to the ICU or had a withdrawal seizure. He currently endorses severe anxiety, insomnia. He drinks to try to get himself to fall asleep. He wakes up at night with what sound like panic attacks. He currently denies suicidal ideation but has felt it before. He was supposed to go to a rehab facility in [place] sometime this week, pending bed availability. Last drink 3/13 ~4pm.    Patient also has been having diarrhea, about 6 episodes so far today, which started last night. Before that he was having nausea and vomiting for half a week (his wife had the same), not sure what he ate to set off his symptoms. Has not felt febrile. Currently has nausea, last vomited yesterday. The diarrhea is very loose/watery, yellow. No blood.    Since admission, patient has been treated with ativan taper for withdrawal, as well as thiamine 500mg x3 doses. Had RUQ US showing cirrhosis and hepatomegaly, as well as hepatofugal flow. Hepatology consulted for cirrhosis management.     Allergies:  No Known Allergies    Home Medications:    Hospital Medications:  acetaminophen     Tablet .. 650 milliGRAM(s) Oral every 6 hours PRN  aluminum hydroxide/magnesium hydroxide/simethicone Suspension 30 milliLiter(s) Oral every 4 hours PRN  chlorhexidine 2% Cloths 1 Application(s) Topical daily  folic acid 1 milliGRAM(s) Oral daily  influenza   Vaccine 0.5 milliLiter(s) IntraMuscular once  lactated ringers. 1000 milliLiter(s) IV Continuous <Continuous>  LORazepam     Tablet 2 milliGRAM(s) Oral every 1 hour PRN  LORazepam     Tablet   Oral   LORazepam     Tablet 4 milliGRAM(s) Oral every 4 hours  LORazepam     Tablet 3 milliGRAM(s) Oral every 4 hours  melatonin 3 milliGRAM(s) Oral at bedtime PRN  nicotine  Polacrilex Lozenge 2 milliGRAM(s) Oral every 2 hours PRN  nicotine -  14 mG/24Hr(s) Patch 1 Patch Transdermal daily  ondansetron Injectable 4 milliGRAM(s) IV Push every 8 hours PRN  thiamine IVPB 500 milliGRAM(s) IV Intermittent daily      PMHX/PSHX:  Alcohol use disorder    No significant past surgical history      Family history:  No pertinent family history in first degree relatives    FH: HTN (hypertension) (Mother)    Denies family history of colon cancer/polyps, stomach cancer/polyps, pancreatic cancer/masses, liver cancer/disease, ovarian cancer and endometrial cancer.    Social History:   Tob: smokes 1/2 PPD  EtOH: drinks 600mL vodka   Illicit Drugs: Denies    ROS:   Per HPI      PHYSICAL EXAM:     GENERAL:  No acute distress  HEENT:  NCAT, no scleral icterus   CHEST:  no respiratory distress  HEART:  Regular rate and rhythm  ABDOMEN:  distended, mildly tense. Non TTP.   EXTREMITIES: No edema  SKIN:  No rash/erythema/ecchymoses/petechiae/wounds/abscess/warm/dry  NEURO:  Alert and oriented x 3, no asterixis    Vital Signs:  Vital Signs Last 24 Hrs  T(C): 37.2 (16 Mar 2023 08:00), Max: 37.2 (16 Mar 2023 08:00)  T(F): 99 (16 Mar 2023 08:00), Max: 99 (16 Mar 2023 08:00)  HR: 115 (16 Mar 2023 08:00) (106 - 118)  BP: 131/92 (16 Mar 2023 08:00) (131/92 - 154/102)  BP(mean): --  RR: 18 (16 Mar 2023 08:00) (18 - 18)  SpO2: 92% (16 Mar 2023 08:00) (92% - 94%)    Parameters below as of 16 Mar 2023 08:00  Patient On (Oxygen Delivery Method): room air      Daily     Daily     LABS:                        13.3   9.98  )-----------( 227      ( 15 Mar 2023 06:56 )             39.6       03-16    138  |  100  |  7   ----------------------------<  73  3.6   |  24  |  0.76    Ca    9.0      16 Mar 2023 06:43  Phos  3.4     03-15  Mg     1.7     03-15    TPro  6.8  /  Alb  3.7  /  TBili  3.6<H>  /  DBili  x   /  AST  139<H>  /  ALT  69<H>  /  AlkPhos  165<H>  03-16    LIVER FUNCTIONS - ( 16 Mar 2023 06:43 )  Alb: 3.7 g/dL / Pro: 6.8 g/dL / ALK PHOS: 165 U/L / ALT: 69 U/L / AST: 139 U/L / GGT: x           PT/INR - ( 14 Mar 2023 15:14 )   PT: 18.0 sec;   INR: 1.56 ratio         PTT - ( 14 Mar 2023 15:14 )  PTT:35.9 sec                            13.3   9.98  )-----------( 227      ( 15 Mar 2023 06:56 )             39.6                         15.0   13.41 )-----------( 265      ( 14 Mar 2023 15:14 )             45.1                         14.5   15.34 )-----------( 273      ( 14 Mar 2023 03:55 )             42.8       Imaging:      < from: US Abdomen Doppler (03.16.23 @ 09:22) >  IMPRESSION:  Hepatofugal flow within the main, right, and left portal veins,   consistent with portal hypertension.    The hepatic veins are not well visualized.    --- End of Report ---      < end of copied text >  < from: US Abdomen Upper Quadrant Right (03.15.23 @ 13:21) >  IMPRESSION:    Cirrhosis and hepatomegaly.    Limited color flow evaluation of the portal vein reveals reversal flow,   concerning for portal hypertension. Dedicated complete Doppler abdominal   ultrasound can be ordered for further workup.    Mild perihepatic fluid.    --- End of Report ---    < end of copied text >

## 2023-03-16 NOTE — CONSULT NOTE ADULT - ATTENDING COMMENTS
37M chronic significant ETOH use, likely ETOH hepatitis - MDF < 32 with swollen and enlarged liver  Concern for cirrhosis on imaging  Discussed at length ETOH Cessation  ~MELD 17 - please check daily MELD  Defer steroids for now as MDF < 32  Optimize nutrition 25-30 kcal/kg 1-1.5 g/kg protein intake daily  Obtain baseline cross sectional imaging MRI with and without contrast liver protocol  Obtain outpatient Fibroscan when patient actually stops drinking - overall fibrosis and inflammation will improve  Ambulate  Consider use of Campral for ETOH cessation  Complete screen for viral hepatitis

## 2023-03-17 LAB
BILIRUB SERPL-MCNC: 3.6 MG/DL — HIGH (ref 0.2–1.2)
CREAT SERPL-MCNC: 0.73 MG/DL — SIGNIFICANT CHANGE UP (ref 0.5–1.3)
EGFR: 121 ML/MIN/1.73M2 — SIGNIFICANT CHANGE UP
INR BLD: 1.7 RATIO — HIGH (ref 0.88–1.16)
MELD SCORE WITH DIALYSIS: 31 POINTS — SIGNIFICANT CHANGE UP
MELD SCORE WITHOUT DIALYSIS: 19 POINTS — SIGNIFICANT CHANGE UP
PROTHROM AB SERPL-ACNC: 19.8 SEC — HIGH (ref 10.5–13.4)
SODIUM SERPL-SCNC: 135 MMOL/L — SIGNIFICANT CHANGE UP (ref 135–145)

## 2023-03-17 PROCEDURE — 99232 SBSQ HOSP IP/OBS MODERATE 35: CPT

## 2023-03-17 RX ORDER — METOPROLOL TARTRATE 50 MG
12.5 TABLET ORAL
Refills: 0 | Status: DISCONTINUED | OUTPATIENT
Start: 2023-03-17 | End: 2023-03-18

## 2023-03-17 RX ADMIN — Medication 1 PATCH: at 11:52

## 2023-03-17 RX ADMIN — Medication 3 MILLIGRAM(S): at 14:00

## 2023-03-17 RX ADMIN — CHLORHEXIDINE GLUCONATE 1 APPLICATION(S): 213 SOLUTION TOPICAL at 12:43

## 2023-03-17 RX ADMIN — Medication 1 PATCH: at 09:04

## 2023-03-17 RX ADMIN — Medication 3 MILLIGRAM(S): at 02:07

## 2023-03-17 RX ADMIN — Medication 1 MILLIGRAM(S): at 11:52

## 2023-03-17 RX ADMIN — Medication 12.5 MILLIGRAM(S): at 18:59

## 2023-03-17 RX ADMIN — Medication 1 PATCH: at 19:12

## 2023-03-17 RX ADMIN — Medication 3 MILLIGRAM(S): at 10:02

## 2023-03-17 RX ADMIN — Medication 2 MILLIGRAM(S): at 23:12

## 2023-03-17 RX ADMIN — Medication 2 MILLIGRAM(S): at 19:00

## 2023-03-17 RX ADMIN — Medication 3 MILLIGRAM(S): at 06:09

## 2023-03-18 LAB
ALBUMIN SERPL ELPH-MCNC: 3.6 G/DL — SIGNIFICANT CHANGE UP (ref 3.3–5)
ALP SERPL-CCNC: 150 U/L — HIGH (ref 40–120)
ALT FLD-CCNC: 66 U/L — HIGH (ref 10–45)
ANION GAP SERPL CALC-SCNC: 11 MMOL/L — SIGNIFICANT CHANGE UP (ref 5–17)
APTT BLD: 35.3 SEC — SIGNIFICANT CHANGE UP (ref 27.5–35.5)
AST SERPL-CCNC: 109 U/L — HIGH (ref 10–40)
BILIRUB SERPL-MCNC: 3.3 MG/DL — HIGH (ref 0.2–1.2)
BUN SERPL-MCNC: 8 MG/DL — SIGNIFICANT CHANGE UP (ref 7–23)
CALCIUM SERPL-MCNC: 8.8 MG/DL — SIGNIFICANT CHANGE UP (ref 8.4–10.5)
CHLORIDE SERPL-SCNC: 103 MMOL/L — SIGNIFICANT CHANGE UP (ref 96–108)
CO2 SERPL-SCNC: 22 MMOL/L — SIGNIFICANT CHANGE UP (ref 22–31)
CREAT SERPL-MCNC: 0.72 MG/DL — SIGNIFICANT CHANGE UP (ref 0.5–1.3)
EGFR: 121 ML/MIN/1.73M2 — SIGNIFICANT CHANGE UP
GLUCOSE SERPL-MCNC: 105 MG/DL — HIGH (ref 70–99)
INR BLD: 1.69 RATIO — HIGH (ref 0.88–1.16)
POTASSIUM SERPL-MCNC: 3.9 MMOL/L — SIGNIFICANT CHANGE UP (ref 3.5–5.3)
POTASSIUM SERPL-SCNC: 3.9 MMOL/L — SIGNIFICANT CHANGE UP (ref 3.5–5.3)
PROT SERPL-MCNC: 6.8 G/DL — SIGNIFICANT CHANGE UP (ref 6–8.3)
PROTHROM AB SERPL-ACNC: 19.5 SEC — HIGH (ref 10.5–13.4)
SODIUM SERPL-SCNC: 136 MMOL/L — SIGNIFICANT CHANGE UP (ref 135–145)

## 2023-03-18 PROCEDURE — 74183 MRI ABD W/O CNTR FLWD CNTR: CPT | Mod: 26

## 2023-03-18 PROCEDURE — 99232 SBSQ HOSP IP/OBS MODERATE 35: CPT

## 2023-03-18 RX ORDER — METOPROLOL TARTRATE 50 MG
25 TABLET ORAL
Refills: 0 | Status: DISCONTINUED | OUTPATIENT
Start: 2023-03-18 | End: 2023-03-21

## 2023-03-18 RX ADMIN — CHLORHEXIDINE GLUCONATE 1 APPLICATION(S): 213 SOLUTION TOPICAL at 09:50

## 2023-03-18 RX ADMIN — Medication 12.5 MILLIGRAM(S): at 07:35

## 2023-03-18 RX ADMIN — Medication 25 MILLIGRAM(S): at 19:28

## 2023-03-18 RX ADMIN — Medication 1 PATCH: at 19:39

## 2023-03-18 RX ADMIN — Medication 1 PATCH: at 11:52

## 2023-03-18 RX ADMIN — Medication 1 MILLIGRAM(S): at 19:28

## 2023-03-18 RX ADMIN — Medication 1 MILLIGRAM(S): at 23:58

## 2023-03-18 RX ADMIN — Medication 2 MILLIGRAM(S): at 09:49

## 2023-03-18 RX ADMIN — Medication 2 MILLIGRAM(S): at 15:21

## 2023-03-18 RX ADMIN — Medication 1 PATCH: at 07:37

## 2023-03-18 RX ADMIN — Medication 2 MILLIGRAM(S): at 07:34

## 2023-03-18 RX ADMIN — Medication 2 MILLIGRAM(S): at 03:19

## 2023-03-18 RX ADMIN — Medication 1 PATCH: at 12:45

## 2023-03-18 RX ADMIN — Medication 1 MILLIGRAM(S): at 12:45

## 2023-03-19 DIAGNOSIS — I10 ESSENTIAL (PRIMARY) HYPERTENSION: ICD-10-CM

## 2023-03-19 LAB
ALBUMIN SERPL ELPH-MCNC: 3.5 G/DL — SIGNIFICANT CHANGE UP (ref 3.3–5)
ALP SERPL-CCNC: 141 U/L — HIGH (ref 40–120)
ALT FLD-CCNC: 62 U/L — HIGH (ref 10–45)
ANION GAP SERPL CALC-SCNC: 12 MMOL/L — SIGNIFICANT CHANGE UP (ref 5–17)
APTT BLD: 37.2 SEC — HIGH (ref 27.5–35.5)
AST SERPL-CCNC: 97 U/L — HIGH (ref 10–40)
BILIRUB SERPL-MCNC: 2.6 MG/DL — HIGH (ref 0.2–1.2)
BUN SERPL-MCNC: 8 MG/DL — SIGNIFICANT CHANGE UP (ref 7–23)
CALCIUM SERPL-MCNC: 8.7 MG/DL — SIGNIFICANT CHANGE UP (ref 8.4–10.5)
CHLORIDE SERPL-SCNC: 105 MMOL/L — SIGNIFICANT CHANGE UP (ref 96–108)
CO2 SERPL-SCNC: 21 MMOL/L — LOW (ref 22–31)
CREAT SERPL-MCNC: 0.72 MG/DL — SIGNIFICANT CHANGE UP (ref 0.5–1.3)
EGFR: 121 ML/MIN/1.73M2 — SIGNIFICANT CHANGE UP
GLUCOSE SERPL-MCNC: 98 MG/DL — SIGNIFICANT CHANGE UP (ref 70–99)
INR BLD: 1.63 RATIO — HIGH (ref 0.88–1.16)
POTASSIUM SERPL-MCNC: 4.1 MMOL/L — SIGNIFICANT CHANGE UP (ref 3.5–5.3)
POTASSIUM SERPL-SCNC: 4.1 MMOL/L — SIGNIFICANT CHANGE UP (ref 3.5–5.3)
PROT SERPL-MCNC: 7 G/DL — SIGNIFICANT CHANGE UP (ref 6–8.3)
PROTHROM AB SERPL-ACNC: 19 SEC — HIGH (ref 10.5–13.4)
SODIUM SERPL-SCNC: 138 MMOL/L — SIGNIFICANT CHANGE UP (ref 135–145)

## 2023-03-19 PROCEDURE — 99232 SBSQ HOSP IP/OBS MODERATE 35: CPT

## 2023-03-19 RX ADMIN — Medication 1 PATCH: at 19:31

## 2023-03-19 RX ADMIN — Medication 25 MILLIGRAM(S): at 05:46

## 2023-03-19 RX ADMIN — CHLORHEXIDINE GLUCONATE 1 APPLICATION(S): 213 SOLUTION TOPICAL at 13:05

## 2023-03-19 RX ADMIN — Medication 30 MILLILITER(S): at 00:01

## 2023-03-19 RX ADMIN — Medication 1 PATCH: at 06:51

## 2023-03-19 RX ADMIN — Medication 1 PATCH: at 13:05

## 2023-03-19 RX ADMIN — Medication 1 MILLIGRAM(S): at 05:46

## 2023-03-19 RX ADMIN — Medication 25 MILLIGRAM(S): at 15:24

## 2023-03-19 RX ADMIN — Medication 1 MILLIGRAM(S): at 13:05

## 2023-03-19 RX ADMIN — Medication 3 MILLIGRAM(S): at 22:57

## 2023-03-19 RX ADMIN — Medication 1 MILLIGRAM(S): at 10:28

## 2023-03-19 RX ADMIN — Medication 1 PATCH: at 13:04

## 2023-03-20 PROCEDURE — 99232 SBSQ HOSP IP/OBS MODERATE 35: CPT

## 2023-03-20 RX ADMIN — Medication 1 MILLIGRAM(S): at 21:10

## 2023-03-20 RX ADMIN — Medication 1 MILLIGRAM(S): at 05:43

## 2023-03-20 RX ADMIN — Medication 1 MILLIGRAM(S): at 13:59

## 2023-03-20 RX ADMIN — Medication 25 MILLIGRAM(S): at 05:43

## 2023-03-20 RX ADMIN — Medication 1 MILLIGRAM(S): at 11:18

## 2023-03-20 RX ADMIN — Medication 1 PATCH: at 19:50

## 2023-03-20 RX ADMIN — Medication 1 PATCH: at 11:20

## 2023-03-20 RX ADMIN — Medication 1 PATCH: at 08:03

## 2023-03-20 RX ADMIN — Medication 1 MILLIGRAM(S): at 18:15

## 2023-03-20 RX ADMIN — Medication 1 PATCH: at 11:18

## 2023-03-20 RX ADMIN — Medication 1 MILLIGRAM(S): at 12:38

## 2023-03-20 RX ADMIN — Medication 25 MILLIGRAM(S): at 18:15

## 2023-03-20 RX ADMIN — CHLORHEXIDINE GLUCONATE 1 APPLICATION(S): 213 SOLUTION TOPICAL at 11:20

## 2023-03-20 NOTE — PROGRESS NOTE ADULT - PROBLEM SELECTOR PLAN 2
alcohol hepatitis  seen on imaging   doppler done showing portal hypertension  Hepatology reccs apprec   MRI done 3/18 shows cirrhosis with portal hypertension, hepatic steatosis, no evidence of HCC.  will need fibroscan as outpatient  alcohol cessation strongly encouraged   hold off on steroids by MDF per hepatology  monitor meld
likely in setting of alcohol use  pt does complain of abdominal distension  check RUQ US  trend LFTs
alcohol hepatitis  seen on imaging - results d/w pt  doppler done showing portal hypertension  hepatology c/s apprec - check MRI, fibroscan as outpatient    alcohol cessation strongly encouraged   hold off on steroids by MDF per hepatology
seen on imaging - results d/w pt  doppler done showing portal hypertension  hepatology c/s  alcohol cessation strongly encouraged
alcohol hepatitis  seen on imaging   doppler done showing portal hypertension  hepatology c/s apprec ; f/up MRI ( ordered)  will need fibroscan as outpatient  alcohol cessation strongly encouraged   hold off on steroids by MDF per hepatology  MELD SCORE 19 on 3/17  ; check daily per Hepatology reccs
alcohol hepatitis  seen on imaging   doppler done showing portal hypertension  Hepatology reccs apprec   MRI done 3/18 shows cirrhosis with portal hypertension, hepatic steatosis, no evidence of HCC.  will need fibroscan as outpatient  alcohol cessation strongly encouraged   hold off on steroids by MDF per hepatology  MELD SCORE 18 on 3/18  ; check daily per Hepatology reccs

## 2023-03-20 NOTE — PROVIDER CONTACT NOTE (OTHER) - BACKGROUND
History of ETOH abuse. CIWA recently discontinued.
Pt admitted for alcohol dependence with withdrawal and suicidal ideation

## 2023-03-20 NOTE — PROGRESS NOTE ADULT - PROBLEM SELECTOR PROBLEM 2
Alcoholic cirrhosis
Alcoholic cirrhosis
Transaminitis
Alcoholic cirrhosis

## 2023-03-20 NOTE — PROGRESS NOTE ADULT - PROBLEM SELECTOR PLAN 3
GI PCR neg  lee d/delmy
GI PCR neg  stop lee
GI PCR neg   lee d/delmy
GI PCR neg  stop lee
GI PCR neg  stop lee
GI PCR neg  lee d/delmy

## 2023-03-20 NOTE — PROGRESS NOTE ADULT - PROBLEM SELECTOR PLAN 5
and depression,   reports that he has been on several medications in the past including Effexor. Prozac, Zoloft, Lithium  currently not on any antidepressants or anxiolytics  will defer to Psych re: initiation of medications
-low improve score, no chemical dvt ppx  -regular diet    Elevated TSH >8 -- free T4 wnl  f/u as outpatient
-low improve score, no chemical dvt ppx  -regular diet    Elevated TSH >8 -- free T4 wnl  f/u as outpatient
and depression,   reports that he has been on several medications in the past including Effexor. Prozac, Zoloft, Lithium  currently not on any antidepressants or anxiolytics  will defer to Psych re: initiation of medications
-low improve score, no chemical dvt ppx  -regular diet    Elevated TSH >8 -- free T4 wnl  f/u as outpatient
-low improve score, no chemical dvt ppx  -regular diet    Elevated TSH >8 -- free T4 wnl  f/u as outpatient

## 2023-03-20 NOTE — PROVIDER CONTACT NOTE (OTHER) - ACTION/TREATMENT ORDERED:
One time dose of Ativan to be ordered. Day team to be alerted.
BRADFORD Staley made aware that CIWA isn't ordered yet. As per PA, wait for hospitalist to put in order and don't do CIWA assessment until order is placed.

## 2023-03-20 NOTE — PROGRESS NOTE ADULT - ASSESSMENT
37M w/pmh alcohol use disorder presents to [facility] with alcohol withdrawal and diarrhea.
37M w/pmh alcohol use disorder presents to [facility] with alcohol withdrawal and diarrhea. Cirrhosis seen on imaging. 
37M w/pmh alcohol use disorder presents to [facility] with alcohol withdrawal and diarrhea. Cirrhosis seen on imaging. 
37M w/pmh alcohol use disorder presents to [facility] with alcohol withdrawal and diarrhea. Cirrhosis seen on imaging. 
37M w/pmh alcohol use disorder presents to [facility] with alcohol withdrawal and diarrhea. Cirrhosis seen on imaging. 
37M w/pmh alcohol use disorder presents to [facility] with alcohol withdrawal and diarrhea. Cirrhosis seen on imaging.

## 2023-03-20 NOTE — PROVIDER CONTACT NOTE (OTHER) - ASSESSMENT
Pt alert & oriented to person, place, time, and situation. Pt able to ambulate independently.
Pt A&Ox4. Pt reports that he's been drinking 500-600 ml of vodka everyday for the past 6 months. Vital signs as charted

## 2023-03-20 NOTE — PROGRESS NOTE ADULT - PROBLEM SELECTOR PLAN 1
- has been on ativan taper and prn ativan per CIWA protocol  - CIWA score 0 ; so  Ativan taper stopped - spoke to psych, concerned he has symptoms not elicited by CIWA scale so would prefer 1-2 more day of Ativan  - SW consult - patient was scheduled to go to rehab/detox center the week he got admitted  - s/p IV 500mg thiamine x 3 days  - c/w folic acid 1mg qd  - Acamprosate suggested by Hepatology ; per Psych will start once ativan completed
-ativan taper and prn ativan per CIWA protocol  - c/w Ativan taper  -SW consult - patient was scheduled to go to rehab/detox center this week before he got admitted  - s/p IV 500mg thiamine x 3 days  - c/w folic acid 1mg qd  - acamprosate suggested by hepatology - f/u timing of initiation with Psych
-ativan taper and prn ativan per CIWA protocol  if CIWA scores remain low by tomorrow will hasten taper   -SW consult - patient was scheduled to go to rehab/detox center this week before he got admitted  -500mg thiamine x3 days  -folic acid 1mg qd  alcohol cessation advised
-ativan taper and prn ativan per CIWA protocol  spoke to psych - slowed taper a bit to ensure withdrawal improved - concern pt was a bit worse than CIWA scores indicated - but now looks better still with low scores - see if can be hastened  -SW consult - patient was scheduled to go to rehab/detox center this week before he got admitted  -500mg thiamine x3 days  -folic acid 1mg qd  acamprosate suggested by hepatology - f/u timing of initiation with psych
- has been on ativan taper and prn ativan per CIWA protocol  - CIWA score 0 ; d/c  Ativan taper  - SW consult - patient was scheduled to go to rehab/detox center the week he got admitted  - s/p IV 500mg thiamine x 3 days  - c/w folic acid 1mg qd  - Acamprosate suggested by Hepatology ; per Psych will likely start 3/20
-ativan taper and prn ativan per CIWA protocol  spoke to psych - slowed taper a bit to ensure withdrawal improved - concern pt was a bit worse than CIWA scores indicated  -SW consult - patient was scheduled to go to rehab/detox center this week before he got admitted  -500mg thiamine x3 days  -folic acid 1mg qd

## 2023-03-20 NOTE — PROGRESS NOTE ADULT - PROBLEM SELECTOR PLAN 6
-low improve score, no chemical dvt ppx  -regular diet    Elevated TSH >8 -- free T4 wnl  f/u as outpatient
-low improve score, no chemical dvt ppx  -regular diet    Elevated TSH >8 -- free T4 wnl  f/u as outpatient

## 2023-03-20 NOTE — PROGRESS NOTE ADULT - SUBJECTIVE AND OBJECTIVE BOX
[facility] Division of Hospital Medicine  Luc Fried MD  Contact M-F, 8A-5P through [employer] Teams  Other Times:  [phone]    Patient is a 37y old  Male who presents with a chief complaint of alcohol withdrawal (19 Mar 2023 10:45)    SUBJECTIVE / OVERNIGHT EVENTS: no events. low ciwa. mother at bedside  ADDITIONAL REVIEW OF SYSTEMS:    MEDICATIONS  (STANDING):  chlorhexidine 2% Cloths 1 Application(s) Topical daily  folic acid 1 milliGRAM(s) Oral daily  influenza   Vaccine 0.5 milliLiter(s) IntraMuscular once  LORazepam     Tablet   Oral   LORazepam     Tablet 1 milliGRAM(s) Oral every 4 hours  metoprolol tartrate 25 milliGRAM(s) Oral two times a day  nicotine -  14 mG/24Hr(s) Patch 1 Patch Transdermal daily    MEDICATIONS  (PRN):  acetaminophen     Tablet .. 650 milliGRAM(s) Oral every 6 hours PRN Temp greater or equal to 38C (100.4F), Mild Pain (1 - 3)  aluminum hydroxide/magnesium hydroxide/simethicone Suspension 30 milliLiter(s) Oral every 4 hours PRN Dyspepsia  melatonin 3 milliGRAM(s) Oral at bedtime PRN Insomnia  nicotine  Polacrilex Lozenge 2 milliGRAM(s) Oral every 2 hours PRN Breakthrough cravings  ondansetron Injectable 4 milliGRAM(s) IV Push every 8 hours PRN Nausea and/or Vomiting      CAPILLARY BLOOD GLUCOSE        I&O's Summary      PHYSICAL EXAM:  Vital Signs Last 24 Hrs  T(C): 36.4 (20 Mar 2023 00:15), Max: 36.4 (20 Mar 2023 00:15)  T(F): 97.5 (20 Mar 2023 00:15), Max: 97.5 (20 Mar 2023 00:15)  HR: 94 (20 Mar 2023 00:15) (94 - 94)  BP: 135/85 (20 Mar 2023 05:40) (130/94 - 135/85)  BP(mean): --  RR: 16 (20 Mar 2023 00:15) (16 - 16)  SpO2: 95% (20 Mar 2023 00:15) (95% - 95%)      GENERAL: NAD, well-groomed  HEAD:  Atraumatic, Normocephalic  EYES: conjunctiva and sclera clear  NECK: Supple, No JVD  CHEST/LUNG: Clear to auscultation bilaterally; No wheeze  HEART: Regular rate and rhythm; No murmurs, rubs, or gallops  ABDOMEN: Soft, Nontender, Nondistended; Bowel sounds present  EXTREMITIES:  2+ Peripheral Pulses, No clubbing, cyanosis, or edema  PSYCH: AAOx3  NEUROLOGY: non-focal , no tremors noted, no tongue fasciculations  SKIN: No rashes or lesions    LABS:    03-19    138  |  105  |  8   ----------------------------<  98  4.1   |  21<L>  |  0.72    Ca    8.7      19 Mar 2023 15:01    TPro  7.0  /  Alb  3.5  /  TBili  2.6<H>  /  DBili  x   /  AST  97<H>  /  ALT  62<H>  /  AlkPhos  141<H>  03-19    PT/INR - ( 19 Mar 2023 15:01 )   PT: 19.0 sec;   INR: 1.63 ratio         PTT - ( 19 Mar 2023 15:01 )  PTT:37.2 sec          COVID-19 PCR: NotDetec (14 Mar 2023 03:55)      RADIOLOGY & ADDITIONAL TESTS:  Results Reviewed:   Imaging Personally Reviewed:  Electrocardiogram Personally Reviewed:    COORDINATION OF CARE:  Care Discussed with Consultants/Other Providers [Y/N]:  Prior or Outpatient Records Reviewed [Y/N]:  
[facility] Division of Hospital Medicine  Luc Fried MD  Contact MAmayaBRITT, 8A-5P through [employer] Teams  Other Times:  [phone]    Patient is a 37y old  Male who presents with a chief complaint of alcohol withdrawal (16 Mar 2023 10:45)      SUBJECTIVE / OVERNIGHT EVENTS: no events low CIWA scores. I discussed w pt results of US showing cirrhosis.   ADDITIONAL REVIEW OF SYSTEMS:    MEDICATIONS  (STANDING):  chlorhexidine 2% Cloths 1 Application(s) Topical daily  folic acid 1 milliGRAM(s) Oral daily  influenza   Vaccine 0.5 milliLiter(s) IntraMuscular once  lactated ringers. 1000 milliLiter(s) (100 mL/Hr) IV Continuous <Continuous>  LORazepam     Tablet   Oral   LORazepam     Tablet 4 milliGRAM(s) Oral every 4 hours  LORazepam     Tablet 3 milliGRAM(s) Oral every 4 hours  nicotine -  14 mG/24Hr(s) Patch 1 Patch Transdermal daily    MEDICATIONS  (PRN):  acetaminophen     Tablet .. 650 milliGRAM(s) Oral every 6 hours PRN Temp greater or equal to 38C (100.4F), Mild Pain (1 - 3)  aluminum hydroxide/magnesium hydroxide/simethicone Suspension 30 milliLiter(s) Oral every 4 hours PRN Dyspepsia  LORazepam     Tablet 2 milliGRAM(s) Oral every 1 hour PRN CIWA-Ar score increase in 2 points  melatonin 3 milliGRAM(s) Oral at bedtime PRN Insomnia  nicotine  Polacrilex Lozenge 2 milliGRAM(s) Oral every 2 hours PRN Breakthrough cravings  ondansetron Injectable 4 milliGRAM(s) IV Push every 8 hours PRN Nausea and/or Vomiting      CAPILLARY BLOOD GLUCOSE        I&O's Summary    15 Mar 2023 07:01  -  16 Mar 2023 07:00  --------------------------------------------------------  IN: 100 mL / OUT: 0 mL / NET: 100 mL    16 Mar 2023 07:01  -  16 Mar 2023 14:25  --------------------------------------------------------  IN: 540 mL / OUT: 0 mL / NET: 540 mL        PHYSICAL EXAM:  Vital Signs Last 24 Hrs  T(C): 36.8 (16 Mar 2023 13:04), Max: 37.2 (16 Mar 2023 08:00)  T(F): 98.2 (16 Mar 2023 13:04), Max: 99 (16 Mar 2023 08:00)  HR: 113 (16 Mar 2023 13:04) (106 - 118)  BP: 137/93 (16 Mar 2023 13:04) (131/92 - 154/102)  BP(mean): --  RR: 18 (16 Mar 2023 13:04) (18 - 18)  SpO2: 92% (16 Mar 2023 13:04) (92% - 94%)    Parameters below as of 16 Mar 2023 13:04  Patient On (Oxygen Delivery Method): room air    CONSTITUTIONAL: Well-groomed, in no apparent distress  EYES: No conjunctival or scleral injection, non-icteric; PERRLA and symmetric  ENMT: No external nasal lesions; no pharyngeal injection or exudates, oral mucosa with moist membranes  NECK: Trachea midline without palpable neck mass; thyroid not enlarged and non-tender  RESPIRATORY: Breathing comfortably; lungs CTA without wheeze/rhonchi/rales  CARDIOVASCULAR: +S1S2, RRR, no M/G/R; pedal pulses full and symmetric; no lower extremity edema  GASTROINTESTINAL: No palpable masses or tenderness, +BS throughout, no rebound/guarding; no hepatosplenomegaly; +distended  MUSCULOSKELETAL: no digital clubbing or cyanosis; no paraspinal tenderness; normal strength and tone of extremities  SKIN: No rashes or ulcers noted; no subcutaneous nodules or induration palpable  NEUROLOGIC: CN II-XII intact; sensation intact in LEs b/l to light touch. No tongue fasciculations. interval resolution of tremor  PSYCHIATRIC: A+O x 3; mood and affect appropriate; appropriate insight and judgment    LABS:                        13.3   9.98  )-----------( 227      ( 15 Mar 2023 06:56 )             39.6     03-16    138  |  100  |  7   ----------------------------<  73  3.6   |  24  |  0.76    Ca    9.0      16 Mar 2023 06:43  Phos  3.4     03-15  Mg     1.7     03-15    TPro  6.8  /  Alb  3.7  /  TBili  3.6<H>  /  DBili  x   /  AST  139<H>  /  ALT  69<H>  /  AlkPhos  165<H>  03-16    PT/INR - ( 14 Mar 2023 15:14 )   PT: 18.0 sec;   INR: 1.56 ratio         PTT - ( 14 Mar 2023 15:14 )  PTT:35.9 sec          COVID-19 PCR: NotDetec (14 Mar 2023 03:55)      RADIOLOGY & ADDITIONAL TESTS:  Results Reviewed:   Imaging Personally Reviewed: < from: US Abdomen Doppler (03.16.23 @ 09:22) >  Hepatofugal flow within the main, right, and left portal veins,   consistent with portal hypertension.    The hepatic veins are not well visualized.      < end of copied text >  < from: US Abdomen Upper Quadrant Right (03.15.23 @ 13:21) >  IMPRESSION:  Cirrhosis and hepatomegaly.    Limited color flow evaluation of the portal vein reveals reversal flow,   concerning for portal hypertension. Dedicated complete Doppler abdominal   ultrasound can be ordered for further workup.    Mild perihepatic fluid.    --- End of Report ---    < end of copied text >    Electrocardiogram Personally Reviewed:    COORDINATION OF CARE:  Care Discussed with Consultants/Other Providers [Y/N]:  Prior or Outpatient Records Reviewed [Y/N]:  
Patient is a 37y old  Male who presents with a chief complaint of alcohol withdrawal (18 Mar 2023 12:35)      SUBJECTIVE / OVERNIGHT EVENTS:  Pt seen and examined. No acute events overnight. He denies headache, palpitations, dizziness, diaphoresis, visual/auditory hallucinations.    MEDICATIONS  (STANDING):  chlorhexidine 2% Cloths 1 Application(s) Topical daily  folic acid 1 milliGRAM(s) Oral daily  influenza   Vaccine 0.5 milliLiter(s) IntraMuscular once  lactated ringers. 1000 milliLiter(s) (100 mL/Hr) IV Continuous <Continuous>  LORazepam     Tablet   Oral   LORazepam     Tablet 2 milliGRAM(s) Oral every 4 hours  LORazepam     Tablet 1 milliGRAM(s) Oral every 4 hours  metoprolol tartrate 12.5 milliGRAM(s) Oral two times a day  nicotine -  14 mG/24Hr(s) Patch 1 Patch Transdermal daily    MEDICATIONS  (PRN):  acetaminophen     Tablet .. 650 milliGRAM(s) Oral every 6 hours PRN Temp greater or equal to 38C (100.4F), Mild Pain (1 - 3)  aluminum hydroxide/magnesium hydroxide/simethicone Suspension 30 milliLiter(s) Oral every 4 hours PRN Dyspepsia  LORazepam     Tablet 2 milliGRAM(s) Oral every 1 hour PRN CIWA-Ar score increase in 2 points  melatonin 3 milliGRAM(s) Oral at bedtime PRN Insomnia  nicotine  Polacrilex Lozenge 2 milliGRAM(s) Oral every 2 hours PRN Breakthrough cravings  ondansetron Injectable 4 milliGRAM(s) IV Push every 8 hours PRN Nausea and/or Vomiting      Vital Signs Last 24 Hrs  T(C): 36.9 (17 Mar 2023 23:53), Max: 37.6 (17 Mar 2023 20:39)  T(F): 98.5 (17 Mar 2023 23:53), Max: 99.6 (17 Mar 2023 20:39)  HR: 92 (17 Mar 2023 23:53) (92 - 115)  BP: 109/72 (17 Mar 2023 23:53) (109/72 - 152/104)  BP(mean): --  RR: 19 (17 Mar 2023 23:53) (19 - 20)  SpO2: 94% (17 Mar 2023 23:53) (93% - 94%)    Parameters below as of 17 Mar 2023 23:53  Patient On (Oxygen Delivery Method): room air      CAPILLARY BLOOD GLUCOSE        I&O's Summary      PHYSICAL EXAM:  GENERAL: NAD, well-groomed  HEAD :  Normocephalic  EYES: conjunctiva and sclera clear  NECK: Supple, No JVD  CHEST/LUNG: Clear to auscultation bilaterally; No wheeze  HEART: Regular rate and rhythm; No murmurs, rubs, or gallops  ABDOMEN: Soft, Nontender, Nondistended; Bowel sounds present  EXTREMITIES:  2+ Peripheral Pulses, No clubbing, cyanosis, or edema  PSYCH: AAOx3  NEUROLOGY: non-focal , no tremors noted  SKIN: No rashes or lesions    LABS:    03-17    135  |  x   |  x   ----------------------------<  x   x    |  x   |  0.73      TPro  x   /  Alb  x   /  TBili  3.6<H>  /  DBili  x   /  AST  x   /  ALT  x   /  AlkPhos  x   03-17    PT/INR - ( 17 Mar 2023 07:28 )   PT: 19.8 sec;   INR: 1.70 ratio                   Consultant(s) Notes Reviewed:  Hepatology    
[facility] Division of Hospital Medicine  Luc Fried MD  Contact MBRITTANEY, 8A-5P through [employer] Teams  Other Times:  [phone]    Patient is a 37y old  Male who presents with a chief complaint of alcohol withdrawal (16 Mar 2023 14:24)    SUBJECTIVE / OVERNIGHT EVENTS: no events. parents at bedside  ADDITIONAL REVIEW OF SYSTEMS:    MEDICATIONS  (STANDING):  chlorhexidine 2% Cloths 1 Application(s) Topical daily  folic acid 1 milliGRAM(s) Oral daily  influenza   Vaccine 0.5 milliLiter(s) IntraMuscular once  lactated ringers. 1000 milliLiter(s) (100 mL/Hr) IV Continuous <Continuous>  LORazepam     Tablet   Oral   LORazepam     Tablet 3 milliGRAM(s) Oral every 4 hours  LORazepam     Tablet 2 milliGRAM(s) Oral every 4 hours  nicotine -  14 mG/24Hr(s) Patch 1 Patch Transdermal daily    MEDICATIONS  (PRN):  acetaminophen     Tablet .. 650 milliGRAM(s) Oral every 6 hours PRN Temp greater or equal to 38C (100.4F), Mild Pain (1 - 3)  aluminum hydroxide/magnesium hydroxide/simethicone Suspension 30 milliLiter(s) Oral every 4 hours PRN Dyspepsia  LORazepam     Tablet 2 milliGRAM(s) Oral every 1 hour PRN CIWA-Ar score increase in 2 points  melatonin 3 milliGRAM(s) Oral at bedtime PRN Insomnia  nicotine  Polacrilex Lozenge 2 milliGRAM(s) Oral every 2 hours PRN Breakthrough cravings  ondansetron Injectable 4 milliGRAM(s) IV Push every 8 hours PRN Nausea and/or Vomiting      CAPILLARY BLOOD GLUCOSE        I&O's Summary    16 Mar 2023 07:01  -  17 Mar 2023 07:00  --------------------------------------------------------  IN: 760 mL / OUT: 0 mL / NET: 760 mL        PHYSICAL EXAM:  Vital Signs Last 24 Hrs  T(C): 36.5 (17 Mar 2023 06:58), Max: 37.1 (17 Mar 2023 00:28)  T(F): 97.7 (17 Mar 2023 06:58), Max: 98.7 (17 Mar 2023 00:28)  HR: 95 (17 Mar 2023 06:58) (94 - 113)  BP: 136/85 (17 Mar 2023 06:58) (135/88 - 149/94)  BP(mean): --  RR: 18 (17 Mar 2023 06:58) (18 - 18)  SpO2: 95% (17 Mar 2023 06:58) (92% - 95%)    Parameters below as of 17 Mar 2023 06:58  Patient On (Oxygen Delivery Method): room air      CONSTITUTIONAL: Well-groomed, in no apparent distress  EYES: No conjunctival or scleral injection, non-icteric; PERRLA and symmetric  ENMT: No external nasal lesions; no pharyngeal injection or exudates, oral mucosa with moist membranes  NECK: Trachea midline without palpable neck mass; thyroid not enlarged and non-tender  RESPIRATORY: Breathing comfortably; lungs CTA without wheeze/rhonchi/rales  CARDIOVASCULAR: +S1S2, RRR, no M/G/R; pedal pulses full and symmetric; no lower extremity edema  GASTROINTESTINAL: No palpable masses or tenderness, +BS throughout, no rebound/guarding; no hepatosplenomegaly; +distended  MUSCULOSKELETAL: no digital clubbing or cyanosis; no paraspinal tenderness; normal strength and tone of extremities  SKIN: No rashes or ulcers noted; no subcutaneous nodules or induration palpable  NEUROLOGIC: CN II-XII intact; sensation intact in LEs b/l to light touch. No tongue fasciculations. interval resolution of tremor  PSYCHIATRIC: A+O x 3; mood and affect appropriate; appropriate insight and judgment    LABS:    03-17    135  |  x   |  x   ----------------------------<  x   x    |  x   |  0.73    Ca    9.0      16 Mar 2023 06:43    TPro  x   /  Alb  x   /  TBili  3.6<H>  /  DBili  x   /  AST  x   /  ALT  x   /  AlkPhos  x   03-17    PT/INR - ( 17 Mar 2023 07:28 )   PT: 19.8 sec;   INR: 1.70 ratio         COVID-19 PCR: NotDetec (14 Mar 2023 03:55)    RADIOLOGY & ADDITIONAL TESTS:  Results Reviewed:   Imaging Personally Reviewed:  Electrocardiogram Personally Reviewed:    COORDINATION OF CARE:  Care Discussed with Consultants/Other Providers [Y/N]:  Prior or Outpatient Records Reviewed [Y/N]:  
[facility] Division of Hospital Medicine  Luc Fried MD  Contact MBRITTANEY, 8A-5P through [employer] Teams  Other Times:  [phone]    Patient is a 37y old  Male who presents with a chief complaint of alcohol withdrawal (14 Mar 2023 13:13)      SUBJECTIVE / OVERNIGHT EVENTS: pt feels well. parents at bedside. low CIWA scores 2-4   ADDITIONAL REVIEW OF SYSTEMS:    MEDICATIONS  (STANDING):  folic acid 1 milliGRAM(s) Oral daily  influenza   Vaccine 0.5 milliLiter(s) IntraMuscular once  lactated ringers. 1000 milliLiter(s) (100 mL/Hr) IV Continuous <Continuous>  LORazepam     Tablet   Oral   LORazepam     Tablet 3 milliGRAM(s) Oral every 4 hours  nicotine -  14 mG/24Hr(s) Patch 1 Patch Transdermal daily  thiamine IVPB 500 milliGRAM(s) IV Intermittent daily    MEDICATIONS  (PRN):  acetaminophen     Tablet .. 650 milliGRAM(s) Oral every 6 hours PRN Temp greater or equal to 38C (100.4F), Mild Pain (1 - 3)  aluminum hydroxide/magnesium hydroxide/simethicone Suspension 30 milliLiter(s) Oral every 4 hours PRN Dyspepsia  LORazepam     Tablet 2 milliGRAM(s) Oral every 1 hour PRN CIWA-Ar score 8 or greater  melatonin 3 milliGRAM(s) Oral at bedtime PRN Insomnia  nicotine  Polacrilex Lozenge 2 milliGRAM(s) Oral every 2 hours PRN Breakthrough cravings  ondansetron Injectable 4 milliGRAM(s) IV Push every 8 hours PRN Nausea and/or Vomiting      CAPILLARY BLOOD GLUCOSE        I&O's Summary    14 Mar 2023 07:  -  15 Mar 2023 07:00  --------------------------------------------------------  IN: 380 mL / OUT: 0 mL / NET: 380 mL    15 Mar 2023 07:  -  15 Mar 2023 14:15  --------------------------------------------------------  IN: 0 mL / OUT: 0 mL / NET: 0 mL        PHYSICAL EXAM:  Vital Signs Last 24 Hrs  T(C): 37.1 (15 Mar 2023 08:55), Max: 37.4 (14 Mar 2023 19:00)  T(F): 98.8 (15 Mar 2023 08:55), Max: 99.4 (14 Mar 2023 19:00)  HR: 107 (15 Mar 2023 08:55) (102 - 109)  BP: 137/69 (15 Mar 2023 08:55) (119/78 - 158/100)  BP(mean): --  RR: 18 (15 Mar 2023 08:55) (17 - 18)  SpO2: 93% (15 Mar 2023 08:55) (93% - 97%)    Parameters below as of 15 Mar 2023 08:55  Patient On (Oxygen Delivery Method): room air    CONSTITUTIONAL: Well-groomed, in no apparent distress  EYES: No conjunctival or scleral injection, non-icteric; PERRLA and symmetric  ENMT: No external nasal lesions; no pharyngeal injection or exudates, oral mucosa with moist membranes  NECK: Trachea midline without palpable neck mass; thyroid not enlarged and non-tender  RESPIRATORY: Breathing comfortably; lungs CTA without wheeze/rhonchi/rales  CARDIOVASCULAR: +S1S2, RRR, no M/G/R; pedal pulses full and symmetric; no lower extremity edema  GASTROINTESTINAL: No palpable masses or tenderness, +BS throughout, no rebound/guarding; no hepatosplenomegaly; +distended  MUSCULOSKELETAL: no digital clubbing or cyanosis; no paraspinal tenderness; normal strength and tone of extremities  SKIN: No rashes or ulcers noted; no subcutaneous nodules or induration palpable  NEUROLOGIC: CN II-XII intact; sensation intact in LEs b/l to light touch. No tongue fasciculations. interval resolution of tremor  PSYCHIATRIC: A+O x 3; mood and affect appropriate; appropriate insight and judgment    LABS:                        13.3   9.98  )-----------( 227      ( 15 Mar 2023 06:56 )             39.6     03-15    134<L>  |  96  |  6<L>  ----------------------------<  68<L>  3.7   |  24  |  0.80    Ca    8.9      15 Mar 2023 06:55  Phos  3.4     03-15  Mg     1.7     -15    TPro  6.7  /  Alb  3.6  /  TBili  3.5<H>  /  DBili  x   /  AST  149<H>  /  ALT  75<H>  /  AlkPhos  166<H>  -15    PT/INR - ( 14 Mar 2023 15:14 )   PT: 18.0 sec;   INR: 1.56 ratio         PTT - ( 14 Mar 2023 15:14 )  PTT:35.9 sec      Urinalysis Basic - ( 14 Mar 2023 05:33 )    Color: Dark Yellow / Appearance: Slightly Turbid / S.033 / pH: x  Gluc: x / Ketone: Trace  / Bili: Small / Urobili: 4 mg/dL   Blood: x / Protein: 100 mg/dL / Nitrite: Negative   Leuk Esterase: Negative / RBC: 1 /hpf / WBC 5 /HPF   Sq Epi: x / Non Sq Epi: 1 /hpf / Bacteria: Negative        COVID-19 PCR: NotDetec (14 Mar 2023 03:55)      RADIOLOGY & ADDITIONAL TESTS:  Results Reviewed:   Imaging Personally Reviewed:  Electrocardiogram Personally Reviewed:    COORDINATION OF CARE:  Care Discussed with Consultants/Other Providers [Y/N]:  Prior or Outpatient Records Reviewed [Y/N]:  
Patient is a 37y old  Male who presents with a chief complaint of alcohol withdrawal (19 Mar 2023 10:45)      SUBJECTIVE / OVERNIGHT EVENTS: Pt seen and examined. He denies headache, palpitations, diaphoresis, visual/auditory hallucinations.     MEDICATIONS  (STANDING):  chlorhexidine 2% Cloths 1 Application(s) Topical daily  folic acid 1 milliGRAM(s) Oral daily  influenza   Vaccine 0.5 milliLiter(s) IntraMuscular once  lactated ringers. 1000 milliLiter(s) (100 mL/Hr) IV Continuous <Continuous>  LORazepam     Tablet   Oral   LORazepam     Tablet 1 milliGRAM(s) Oral every 4 hours  metoprolol tartrate 25 milliGRAM(s) Oral two times a day  nicotine -  14 mG/24Hr(s) Patch 1 Patch Transdermal daily    MEDICATIONS  (PRN):  acetaminophen     Tablet .. 650 milliGRAM(s) Oral every 6 hours PRN Temp greater or equal to 38C (100.4F), Mild Pain (1 - 3)  aluminum hydroxide/magnesium hydroxide/simethicone Suspension 30 milliLiter(s) Oral every 4 hours PRN Dyspepsia  LORazepam     Tablet 2 milliGRAM(s) Oral every 1 hour PRN CIWA-Ar score increase in 2 points  melatonin 3 milliGRAM(s) Oral at bedtime PRN Insomnia  nicotine  Polacrilex Lozenge 2 milliGRAM(s) Oral every 2 hours PRN Breakthrough cravings  ondansetron Injectable 4 milliGRAM(s) IV Push every 8 hours PRN Nausea and/or Vomiting      Vital Signs Last 24 Hrs  T(C): 37.2 (19 Mar 2023 09:13), Max: 37.4 (18 Mar 2023 16:06)  T(F): 99 (19 Mar 2023 09:13), Max: 99.4 (18 Mar 2023 16:06)  HR: 88 (19 Mar 2023 09:13) (85 - 100)  BP: 129/88 (19 Mar 2023 09:13) (111/73 - 146/93)  BP(mean): --  RR: 18 (19 Mar 2023 09:13) (18 - 19)  SpO2: 95% (19 Mar 2023 09:13) (94% - 95%)    Parameters below as of 19 Mar 2023 09:13  Patient On (Oxygen Delivery Method): room air      CAPILLARY BLOOD GLUCOSE        I&O's Summary      PHYSICAL EXAM:  GENERAL: NAD, well-groomed  HEAD:  Atraumatic, Normocephalic  EYES: conjunctiva and sclera clear  NECK: Supple, No JVD  CHEST/LUNG: Clear to auscultation bilaterally; No wheeze  HEART: Regular rate and rhythm; No murmurs, rubs, or gallops  ABDOMEN: Soft, Nontender, Nondistended; Bowel sounds present  EXTREMITIES:  2+ Peripheral Pulses, No clubbing, cyanosis, or edema  PSYCH: AAOx3  NEUROLOGY: non-focal , no tremors noted, no tongue fasciculations  SKIN: No rashes or lesions    LABS:    03-18    136  |  103  |  8   ----------------------------<  105<H>  3.9   |  22  |  0.72    Ca    8.8      18 Mar 2023 15:57    TPro  6.8  /  Alb  3.6  /  TBili  3.3<H>  /  DBili  x   /  AST  109<H>  /  ALT  66<H>  /  AlkPhos  150<H>  03-18    PT/INR - ( 18 Mar 2023 15:57 )   PT: 19.5 sec;   INR: 1.69 ratio         PTT - ( 18 Mar 2023 15:57 )  PTT:35.3 sec          RADIOLOGY & ADDITIONAL TESTS:    Imaging Personally Reviewed:  MRI ABD  1.  Cirrhosis with portal hypertension. Hepatic steatosis.  2.  No evidence of HCC.            Care Discussed with Consultants/Other Providers: Psych

## 2023-03-20 NOTE — PROGRESS NOTE ADULT - PROBLEM SELECTOR PLAN 4
- started on Lopressor 25mg po bid  - Bp has been labile ; now better controlled  - pt reports elevated bp and tachycardia when he gets anxious
and depression,     psych following
and depression,     psych following
- started on Lopressor 25mg po bid  - Bp has been labile ; now better controlled  - pt reports elevated bp and tachycardia when he gets anxious
and depression,     psych following
and depression,     psych following

## 2023-03-21 ENCOUNTER — TRANSCRIPTION ENCOUNTER (OUTPATIENT)
Age: 38
End: 2023-03-21

## 2023-03-21 VITALS
HEART RATE: 85 BPM | TEMPERATURE: 98 F | DIASTOLIC BLOOD PRESSURE: 79 MMHG | OXYGEN SATURATION: 97 % | SYSTOLIC BLOOD PRESSURE: 122 MMHG | RESPIRATION RATE: 18 BRPM

## 2023-03-21 LAB
ALBUMIN SERPL ELPH-MCNC: 3.5 G/DL — SIGNIFICANT CHANGE UP (ref 3.3–5)
ALP SERPL-CCNC: 127 U/L — HIGH (ref 40–120)
ALT FLD-CCNC: 61 U/L — HIGH (ref 10–45)
ANION GAP SERPL CALC-SCNC: 13 MMOL/L — SIGNIFICANT CHANGE UP (ref 5–17)
AST SERPL-CCNC: 84 U/L — HIGH (ref 10–40)
BILIRUB SERPL-MCNC: 2.3 MG/DL — HIGH (ref 0.2–1.2)
BUN SERPL-MCNC: 8 MG/DL — SIGNIFICANT CHANGE UP (ref 7–23)
CALCIUM SERPL-MCNC: 9.1 MG/DL — SIGNIFICANT CHANGE UP (ref 8.4–10.5)
CHLORIDE SERPL-SCNC: 103 MMOL/L — SIGNIFICANT CHANGE UP (ref 96–108)
CO2 SERPL-SCNC: 23 MMOL/L — SIGNIFICANT CHANGE UP (ref 22–31)
CREAT SERPL-MCNC: 0.7 MG/DL — SIGNIFICANT CHANGE UP (ref 0.5–1.3)
EGFR: 122 ML/MIN/1.73M2 — SIGNIFICANT CHANGE UP
GLUCOSE SERPL-MCNC: 75 MG/DL — SIGNIFICANT CHANGE UP (ref 70–99)
INR BLD: 1.54 RATIO — HIGH (ref 0.88–1.16)
POTASSIUM SERPL-MCNC: 3.9 MMOL/L — SIGNIFICANT CHANGE UP (ref 3.5–5.3)
POTASSIUM SERPL-SCNC: 3.9 MMOL/L — SIGNIFICANT CHANGE UP (ref 3.5–5.3)
PROT SERPL-MCNC: 7 G/DL — SIGNIFICANT CHANGE UP (ref 6–8.3)
PROTHROM AB SERPL-ACNC: 18 SEC — HIGH (ref 10.5–13.4)
SARS-COV-2 RNA SPEC QL NAA+PROBE: SIGNIFICANT CHANGE UP
SODIUM SERPL-SCNC: 139 MMOL/L — SIGNIFICANT CHANGE UP (ref 135–145)

## 2023-03-21 PROCEDURE — 87507 IADNA-DNA/RNA PROBE TQ 12-25: CPT

## 2023-03-21 PROCEDURE — 36415 COLL VENOUS BLD VENIPUNCTURE: CPT

## 2023-03-21 PROCEDURE — 99285 EMERGENCY DEPT VISIT HI MDM: CPT | Mod: 25

## 2023-03-21 PROCEDURE — 99239 HOSP IP/OBS DSCHRG MGMT >30: CPT

## 2023-03-21 PROCEDURE — 90792 PSYCH DIAG EVAL W/MED SRVCS: CPT

## 2023-03-21 PROCEDURE — 85025 COMPLETE CBC W/AUTO DIFF WBC: CPT

## 2023-03-21 PROCEDURE — 81001 URINALYSIS AUTO W/SCOPE: CPT

## 2023-03-21 PROCEDURE — 80048 BASIC METABOLIC PNL TOTAL CA: CPT

## 2023-03-21 PROCEDURE — U0003: CPT

## 2023-03-21 PROCEDURE — 84100 ASSAY OF PHOSPHORUS: CPT

## 2023-03-21 PROCEDURE — 84443 ASSAY THYROID STIM HORMONE: CPT

## 2023-03-21 PROCEDURE — 85610 PROTHROMBIN TIME: CPT

## 2023-03-21 PROCEDURE — 80053 COMPREHEN METABOLIC PANEL: CPT

## 2023-03-21 PROCEDURE — 76705 ECHO EXAM OF ABDOMEN: CPT

## 2023-03-21 PROCEDURE — 80076 HEPATIC FUNCTION PANEL: CPT

## 2023-03-21 PROCEDURE — 93975 VASCULAR STUDY: CPT

## 2023-03-21 PROCEDURE — 80307 DRUG TEST PRSMV CHEM ANLYZR: CPT

## 2023-03-21 PROCEDURE — 83735 ASSAY OF MAGNESIUM: CPT

## 2023-03-21 PROCEDURE — U0005: CPT

## 2023-03-21 PROCEDURE — 85730 THROMBOPLASTIN TIME PARTIAL: CPT

## 2023-03-21 PROCEDURE — 87635 SARS-COV-2 COVID-19 AMP PRB: CPT

## 2023-03-21 PROCEDURE — 74183 MRI ABD W/O CNTR FLWD CNTR: CPT

## 2023-03-21 PROCEDURE — 85027 COMPLETE CBC AUTOMATED: CPT

## 2023-03-21 PROCEDURE — A9585: CPT

## 2023-03-21 PROCEDURE — 84439 ASSAY OF FREE THYROXINE: CPT

## 2023-03-21 RX ORDER — METOPROLOL TARTRATE 50 MG
1 TABLET ORAL
Qty: 60 | Refills: 0
Start: 2023-03-21 | End: 2023-04-19

## 2023-03-21 RX ORDER — NICOTINE POLACRILEX 2 MG
1 GUM BUCCAL
Qty: 360 | Refills: 0
Start: 2023-03-21 | End: 2023-04-19

## 2023-03-21 RX ORDER — FOLIC ACID 0.8 MG
1 TABLET ORAL
Qty: 30 | Refills: 0
Start: 2023-03-21 | End: 2023-04-19

## 2023-03-21 RX ORDER — ACAMPROSATE CALCIUM 333 MG/1
2 TABLET, DELAYED RELEASE ORAL
Qty: 180 | Refills: 0
Start: 2023-03-21 | End: 2023-04-19

## 2023-03-21 RX ORDER — NICOTINE POLACRILEX 2 MG
1 GUM BUCCAL
Qty: 30 | Refills: 0
Start: 2023-03-21 | End: 2023-04-19

## 2023-03-21 RX ADMIN — CHLORHEXIDINE GLUCONATE 1 APPLICATION(S): 213 SOLUTION TOPICAL at 11:50

## 2023-03-21 RX ADMIN — Medication 1 PATCH: at 11:48

## 2023-03-21 RX ADMIN — Medication 25 MILLIGRAM(S): at 04:58

## 2023-03-21 RX ADMIN — Medication 1 PATCH: at 07:43

## 2023-03-21 RX ADMIN — Medication 1 MILLIGRAM(S): at 04:58

## 2023-03-21 RX ADMIN — Medication 1 MILLIGRAM(S): at 02:24

## 2023-03-21 RX ADMIN — Medication 1 MILLIGRAM(S): at 11:48

## 2023-03-21 RX ADMIN — Medication 0.5 MILLIGRAM(S): at 10:02

## 2023-03-21 NOTE — BH CONSULTATION LIAISON PROGRESS NOTE - NSBHCONSULTPRIMARYDISCUSSYES_PSY_A_CORE FT
Discussed recommendations for increasing restarting he Ativan back to 1mg q4hrs 
Discussed recommendations for increasing restarting he Ativan back to 1mg q4hrs 
Discussed recommendations for increasing the Ativan back to 4mg po q4hrs for another day with his medical attending, Dr Fried and adding a prn of Ativan 2mg IVP for CIWA greater that 8.

## 2023-03-21 NOTE — BH CONSULTATION LIAISON PROGRESS NOTE - NSBHASSESSMENTFT_PSY_ALL_CORE
Patient is a 37 year old man, currently living at home with his wife no children, working as an  for insurance company [employer] with history of depression, anxiety and alcohol abuse, previously on several SSRIs including fluoxetine (unsure of name of all), lithium - all discontinued now, has seen psychiatrist in the past but stopped a year ago, with PMHx of stomach ulcer (treated with sucralfate), current smoker for the last 15 years, half a pack a day, history of alcohol dependence- quit drinking for 2 years and restarted 6 months ago about 500ml - 600ml of vodka everyday.  PT has no thoughts of harming himself now and his parents who later arrived at bedside deny any past suicidal attempts.  Pt is very tremulous and in acute alcohol withdrawal and has a high tolerance to alcohol and benzodiazepines.   Pt and his family plan for inpatient alcohol rehab once he is cleared medically.  3/15/23 Pt is very tachycardic and having symptoms of alcohol withdrawal with some worsening symptoms since his Ativan was tapered from 4mg q 4hrs to 3mg q 4hrs today. He is aware that he is scheduled for another [facility] tomorrow.  Discussed recommendations for increasing the Ativan back to 4mg po q4hrs for another day with his medical attending, Dr Fried and adding a prn of Ativan 2mg IVP for CIWA greater that 8.  Pt's Ativan 2mg PO PRN was changed to increase in CIWA by 2 points. 
Patient is a 37 year old man, currently living at home with his wife no children, working as an  for insurance company [employer] with history of depression, anxiety and alcohol abuse, previously on several SSRIs including fluoxetine (unsure of name of all), lithium - all discontinued now, has seen psychiatrist in the past but stopped a year ago, with PMHx of stomach ulcer (treated with sucralfate), current smoker for the last 15 years, half a pack a day, history of alcohol dependence- quit drinking for 2 years and restarted 6 months ago about 500ml - 600ml of vodka everyday.  PT has no thoughts of harming himself now and his parents who later arrived at bedside deny any past suicidal attempts.  Pt is very tremulous and in acute alcohol withdrawal and has a high tolerance to alcohol and benzodiazepines.   Pt and his family plan for inpatient alcohol rehab once he is cleared medically.  3/15/23 Pt is very tachycardic and having symptoms of alcohol withdrawal with some worsening symptoms since his Ativan was tapered from 4mg q 4hrs to 3mg q 4hrs today. He is aware that he is scheduled for another [facility] tomorrow.  Discussed recommendations for restarting the Ativan back at 1mg po q4hrs for another day with his medical attending, Dr Fried.
Patient is a 37 year old man, currently living at home with his wife no children, working as an  for insurance company [employer] with history of depression, anxiety and alcohol abuse, previously on several SSRIs including fluoxetine (unsure of name of all), lithium - all discontinued now, has seen psychiatrist in the past but stopped a year ago, with PMHx of stomach ulcer (treated with sucralfate), current smoker for the last 15 years, half a pack a day, history of alcohol dependence- quit drinking for 2 years and restarted 6 months ago about 500ml - 600ml of vodka everyday.  PT has no thoughts of harming himself now and his parents who later arrived at bedside deny any past suicidal attempts.    Pt is feeling better and completed the ativan taper 1mg and had one dose of the 0.5mg this am but has no symptoms of alcohol withdrawal.  He is planning to a attend the 28 day program at [facility] in [facility] and plans to f/u with outpt therapy and has numbers for the [facility] program.

## 2023-03-21 NOTE — DISCHARGE NOTE PROVIDER - HOSPITAL COURSE
37M w/pmh alcohol use disorder presents to [facility] with alcohol withdrawal and diarrhea. Cirrhosis seen on imaging.       Alcohol dependence with withdrawal.   ·  Plan: - has been on ativan taper and prn ativan per CIWA protocol  - CIWA score 0 ; so  Ativan taper stopped - spoke to psych, concerned he has symptoms not elicited by CIWA scale so would prefer 1-2 more day of Ativan  - SW consult - patient was scheduled to go to rehab/detox center the week he got admitted  - s/p IV 500mg thiamine x 3 days  - c/w folic acid 1mg qd  - Acamprosate suggested by Hepatology ; per Psych will start once ativan completed.  Alcoholic cirrhosis.   ·  Plan: alcohol hepatitis  seen on imaging   doppler done showing portal hypertension  Hepatology reccs apprec   MRI done 3/18 shows cirrhosis with portal hypertension, hepatic steatosis, no evidence of HCC.  will need fibroscan as outpatient  alcohol cessation strongly encouraged   hold off on steroids by MDF per hepatology  monitor meld.     Problem/Plan - 3:  ·  Problem: Diarrhea.   ·  Plan: GI PCR neg  azithro d/delmy.     Problem/Plan - 4:  ·  Problem: HTN (hypertension).   ·  Plan: - started on Lopressor 25mg po bid  - Bp has been labile ; now better controlled  - pt reports elevated bp and tachycardia when he gets anxious.     Problem/Plan - 5:  ·  Problem: Anxiety.   ·  Plan: and depression,   reports that he has been on several medications in the past including Effexor. Prozac, Zoloft, Lithium  currently not on any antidepressants or anxiolytics  will defer to Psych re: initiation of medications.     Problem/Plan - 6:  ·  Problem: Prophylactic measure.   ·  Plan: -low improve score, no chemical dvt ppx  -regular diet    Elevated TSH >8 -- free T4 wnl  f/u as outpatient.   37M w/pmh alcohol use disorder presents to [facility] with alcohol withdrawal and diarrhea. Cirrhosis seen on imaging.       Alcohol dependence with withdrawal.   ·  Plan: - has been on ativan taper and prn ativan per CIWA protocol  - CIWA score 0 ; completed Ativan taper->  spoke to psych, concerned he has symptoms not elicited by CIWA scale so would prefer 1-2 more day of Ativan  - SW consult - patient was scheduled to go to rehab/detox center the week he got admitted  - s/p IV 500mg thiamine x 3 days  - c/w folic acid 1mg qd  - Acamprosate suggested by Hepatology ; per Psych will start once ativan completed.    Alcoholic cirrhosis.   Alcohol hepatitis-> seen on imaging   doppler done showing portal hypertension  Hepatology recs apprec -> hold off on steroids by MDF per hepatology   MRI done 3/18 shows cirrhosis with portal hypertension, hepatic steatosis, no evidence of HCC.  monitor meld.  Obtain outpatient Fibroscan when patient actually stops drinking - overall fibrosis and inflammation will improve  alcohol cessation strongly encouraged     Diarrhea.   ·  Plan: GI PCR neg  azithro d/delmy.      HTN (hypertension).   ·  Plan: - started on Lopressor 25mg po bid  - Bp has been labile ; now better controlled  - pt reports elevated bp and tachycardia when he gets anxious.     Anxiety.   ·  Plan: anxiety and depression,   reports that he has been on several medications in the past including Effexor. Prozac, Zoloft, Lithium  currently not on any antidepressants or anxiolytics  will defer to Psych re: initiation of medications.      Elevated TSH >8 -- free T4 wnl  f/u as outpatient.  Pt medically stable for discharge

## 2023-03-21 NOTE — DISCHARGE NOTE PROVIDER - NSDCFUADDAPPT_GEN_ALL_CORE_FT
APPTS ARE READY TO BE MADE: [x ] YES    Best Family or Patient Contact (if needed):    Additional Information about above appointments (if needed):    1:   2:   3:     Other comments or requests:    APPTS ARE READY TO BE MADE: [x ] YES    Best Family or Patient Contact (if needed):    Additional Information about above appointments (if needed):    1:   2:   3:     Other comments or requests:     3 attempts were made to reach patient, which have been unsuccessful. 3 Voicemails have been left on  3/21, 3/22 and 3/23. Will await a call back from patient to coordinate follow up care.

## 2023-03-21 NOTE — DISCHARGE NOTE PROVIDER - ATTENDING DISCHARGE PHYSICAL EXAMINATION:
patient seen and examined at bedside with parents present  CIWA = 0, feels well  ambulating normally  RRR CTAB NT ND BS+   AOx3 no agitation normal affect   counseled etoh cessation - pt is motivated.   counseled hepatology f/u for fibroscan and other mgmt  psych f/u as outpatient  d/w psych - no objection to dc. would start acamprosate at this time - prescribed

## 2023-03-21 NOTE — BH CONSULTATION LIAISON PROGRESS NOTE - NSBHFUPINTERVALHXFT_PSY_A_CORE
Pt is feeling better and completed the ativan taper 1mg and had one dose of the 0.5mg this am but has no symptoms of alcohol withdrawal.  He is planning to a attend the 28 day program at [facility] in [facility] and plans to f/u with outpt therapy and has numbers for the [facility] program. 
Pt is tachycardic and complains of twitching.  He is with his parents at bedside discussing rehab but reluctant to commit to a 28 day program.  Discussed restarting his Ativan taper back at 1mg po q4hrs for six doses followed by 0.5mg po q4hrs for six more doses.  
Pt is very tachycardic and having symptoms of alcohol withdrawal with some worsening symptoms since his Ativan was tapered from 4mg q 4hrs to 3mg q 4hrs today. He is aware that he is scheduled for another [facility] tomorrow.  Discussed recommendations for increasing the Ativan back to 4mg po q4hrs for another day with his medical attending, Dr Fried and adding a prn of Ativan 2mg IVP for CIWA greater that 8.  Pt's Ativan 2mg PO PRN was changed to increase in CIWA by 2 points.

## 2023-03-21 NOTE — DISCHARGE NOTE PROVIDER - NSDCMRMEDTOKEN_GEN_ALL_CORE_FT
acamprosate 333 mg oral delayed release tablet: 2 tab(s) orally 3 times a day   folic acid 1 mg oral tablet: 1 tab(s) orally once a day  metoprolol tartrate 25 mg oral tablet: 1 tab(s) orally 2 times a day  nicotine 14 mg/24 hr transdermal film, extended release: 1 patch transdermal once a day   nicotine 2 mg oral transmucosal lozenge: 1 lozenge by transmucosal administration every 2 hours, As Needed fro breakthrough cravings

## 2023-03-21 NOTE — BH CONSULTATION LIAISON PROGRESS NOTE - NSICDXBHPRIMARYDX_PSY_ALL_CORE
Alcohol dependence with withdrawal with complication   F10.239  

## 2023-03-21 NOTE — BH CONSULTATION LIAISON PROGRESS NOTE - NSBHCONSULTFOLLOWAFTERCARE_PSY_A_CORE FT
Pt and his family plan for inpatient alcohol rehab once he is cleared medically.
  Pt and his family plan for inpatient alcohol rehab at [facility] in [place],  
  Pt and his family plan for inpatient alcohol rehab once he is cleared medically.

## 2023-03-21 NOTE — DISCHARGE NOTE NURSING/CASE MANAGEMENT/SOCIAL WORK - PATIENT PORTAL LINK FT
You can access the FollowMyHealth Patient Portal offered by [facility] by registering at the following website: [email or web address]. By joining [employer]’s FollowMyHealth portal, you will also be able to view your health information using other applications (apps) compatible with our system.

## 2023-03-21 NOTE — DISCHARGE NOTE NURSING/CASE MANAGEMENT/SOCIAL WORK - NSDCPEFALRISK_GEN_ALL_CORE
For information on Fall & Injury Prevention, visit: [email or web address] OR  [email or web address] OR  https://www.cdc.gov/steadi/patient.html

## 2023-03-21 NOTE — DISCHARGE NOTE NURSING/CASE MANAGEMENT/SOCIAL WORK - NSDCPEEMAIL_GEN_ALL_CORE
[facility] for Tobacco Control email [email or web address]

## 2023-03-21 NOTE — DISCHARGE NOTE PROVIDER - NSDCCPCAREPLAN_GEN_ALL_CORE_FT
PRINCIPAL DISCHARGE DIAGNOSIS  Diagnosis: Alcohol dependence with withdrawal  Assessment and Plan of Treatment: You have as been on ativan taper and prn ativan per [facility] protocol. You will be discharge to [facility] where you will complete your treatment      SECONDARY DISCHARGE DIAGNOSES  Diagnosis: Alcoholic cirrhosis  Assessment and Plan of Treatment: Hepatology  following  -> hold off on steroids by per hepatology   MRI done 3/18 shows cirrhosis with portal hypertension, hepatic steatosis, no evidence of HCC.  monitor meld score.  Obtain outpatient Fibroscan when patient actually stops drinking - overall fibrosis and inflammation will improve  alcohol cessation strongly encouraged  Please follow up with Hepatology after Alcohol rehab    Diagnosis: HTN (hypertension)  Assessment and Plan of Treatment: Low salt diet  Activity as tolerated.  Take all medication as prescribed.  Follow up with your medical doctor for routine blood pressure monitoring at your next visit.  Notify your doctor if you have any of the following symptoms:   Dizziness, Lightheadedness, Blurry vision, Headache, Chest pain, Shortness of breath      Diagnosis: Anxiety  Assessment and Plan of Treatment: Please follow up with [facility] as outpt for reinitiation of medications if needed    Diagnosis: Diarrhea  Assessment and Plan of Treatment: GI PCR neg  Now improved    Diagnosis: Elevated TSH  Assessment and Plan of Treatment: Please follow out pt with your PCP  Free T4 NORMAL

## 2023-03-21 NOTE — DISCHARGE NOTE PROVIDER - CARE PROVIDER_API CALL
John Gibbs)  Gastroenterology; Internal Medicine; Transplant Hepatology  [address] [place] [address]  Phone: [phone]  Fax: [phone]  Follow Up Time:    John Gibbs)  Gastroenterology; Internal Medicine; Transplant Hepatology  [address] [place] [address]  Phone: [phone]  Fax: [phone]  Follow Up Time: 1 month

## 2023-03-21 NOTE — DISCHARGE NOTE PROVIDER - NSFOLLOWUPCLINICS_GEN_ALL_ED_FT
[facility] Psych[facility]  Psychiatry  [address] [place] [address]  Phone: [phone]  Fax:   Follow Up Time: 1 month

## 2023-03-21 NOTE — DISCHARGE NOTE PROVIDER - PROVIDER TOKENS
PROVIDER:[TOKEN:[[id number]:[facility]:[id number]]] PROVIDER:[TOKEN:[[id number]:[facility]:[id number]],FOLLOWUP:[1 month]]

## 2023-03-21 NOTE — BH CONSULTATION LIAISON PROGRESS NOTE - NSBHFUPINTERVALCCFT_PSY_A_CORE
"I feel better and I'm going to a 28 day program at [facility] in [place], [place]."
"I had some vivid dreams and saw some people sitting in my room when no one was there."
"I had some twitching last night and needed a dose of Ativan."

## 2023-03-21 NOTE — BH CONSULTATION LIAISON PROGRESS NOTE - CURRENT MEDICATION
MEDICATIONS  (STANDING):  chlorhexidine 2% Cloths 1 Application(s) Topical daily  folic acid 1 milliGRAM(s) Oral daily  influenza   Vaccine 0.5 milliLiter(s) IntraMuscular once  LORazepam     Tablet   Oral   LORazepam     Tablet 1 milliGRAM(s) Oral every 4 hours  metoprolol tartrate 25 milliGRAM(s) Oral two times a day  nicotine -  14 mG/24Hr(s) Patch 1 Patch Transdermal daily    MEDICATIONS  (PRN):  acetaminophen     Tablet .. 650 milliGRAM(s) Oral every 6 hours PRN Temp greater or equal to 38C (100.4F), Mild Pain (1 - 3)  aluminum hydroxide/magnesium hydroxide/simethicone Suspension 30 milliLiter(s) Oral every 4 hours PRN Dyspepsia  melatonin 3 milliGRAM(s) Oral at bedtime PRN Insomnia  nicotine  Polacrilex Lozenge 2 milliGRAM(s) Oral every 2 hours PRN Breakthrough cravings  ondansetron Injectable 4 milliGRAM(s) IV Push every 8 hours PRN Nausea and/or Vomiting  
MEDICATIONS  (STANDING):  folic acid 1 milliGRAM(s) Oral daily  influenza   Vaccine 0.5 milliLiter(s) IntraMuscular once  lactated ringers. 1000 milliLiter(s) (100 mL/Hr) IV Continuous <Continuous>  LORazepam     Tablet   Oral   LORazepam     Tablet 3 milliGRAM(s) Oral every 4 hours  nicotine -  14 mG/24Hr(s) Patch 1 Patch Transdermal daily  thiamine IVPB 500 milliGRAM(s) IV Intermittent daily    MEDICATIONS  (PRN):  acetaminophen     Tablet .. 650 milliGRAM(s) Oral every 6 hours PRN Temp greater or equal to 38C (100.4F), Mild Pain (1 - 3)  aluminum hydroxide/magnesium hydroxide/simethicone Suspension 30 milliLiter(s) Oral every 4 hours PRN Dyspepsia  LORazepam     Tablet 2 milliGRAM(s) Oral every 1 hour PRN CIWA-Ar score increase in 2 points  melatonin 3 milliGRAM(s) Oral at bedtime PRN Insomnia  nicotine  Polacrilex Lozenge 2 milliGRAM(s) Oral every 2 hours PRN Breakthrough cravings  ondansetron Injectable 4 milliGRAM(s) IV Push every 8 hours PRN Nausea and/or Vomiting  
MEDICATIONS  (STANDING):  chlorhexidine 2% Cloths 1 Application(s) Topical daily  folic acid 1 milliGRAM(s) Oral daily  influenza   Vaccine 0.5 milliLiter(s) IntraMuscular once  LORazepam     Tablet   Oral   LORazepam     Tablet 0.5 milliGRAM(s) Oral every 4 hours  metoprolol tartrate 25 milliGRAM(s) Oral two times a day  nicotine -  14 mG/24Hr(s) Patch 1 Patch Transdermal daily    MEDICATIONS  (PRN):  acetaminophen     Tablet .. 650 milliGRAM(s) Oral every 6 hours PRN Temp greater or equal to 38C (100.4F), Mild Pain (1 - 3)  aluminum hydroxide/magnesium hydroxide/simethicone Suspension 30 milliLiter(s) Oral every 4 hours PRN Dyspepsia  melatonin 3 milliGRAM(s) Oral at bedtime PRN Insomnia  nicotine  Polacrilex Lozenge 2 milliGRAM(s) Oral every 2 hours PRN Breakthrough cravings  ondansetron Injectable 4 milliGRAM(s) IV Push every 8 hours PRN Nausea and/or Vomiting

## 2023-03-21 NOTE — DISCHARGE NOTE NURSING/CASE MANAGEMENT/SOCIAL WORK - NSDCPEWEB_GEN_ALL_CORE
[facility] for Tobacco Control website --- [email or web address] website --- [email or web address]

## 2023-03-21 NOTE — DISCHARGE NOTE NURSING/CASE MANAGEMENT/SOCIAL WORK - NSSBIRTALCACTIVEREFTXDET_GEN_A_CORE
Patient reports being set up for [facility] for inpatient treatment for tomorrow, 9 AM. He reports family has notified [facility] of delay in admission. Patient interested in doing such once he is discharged from the hospital.

## 2023-03-21 NOTE — BH CONSULTATION LIAISON PROGRESS NOTE - NSBHCHARTREVIEWVS_PSY_A_CORE FT
Vital Signs Last 24 Hrs  T(C): 36.4 (20 Mar 2023 00:15), Max: 36.4 (20 Mar 2023 00:15)  T(F): 97.5 (20 Mar 2023 00:15), Max: 97.5 (20 Mar 2023 00:15)  HR: 94 (20 Mar 2023 00:15) (94 - 94)  BP: 135/85 (20 Mar 2023 05:40) (130/94 - 135/85)  BP(mean): --  RR: 16 (20 Mar 2023 00:15) (16 - 16)  SpO2: 95% (20 Mar 2023 00:15) (95% - 95%)    
Vital Signs Last 24 Hrs  T(C): 36.9 (15 Mar 2023 15:49), Max: 37.4 (14 Mar 2023 19:00)  T(F): 98.4 (15 Mar 2023 15:49), Max: 99.4 (14 Mar 2023 19:00)  HR: 118 (15 Mar 2023 15:49) (102 - 118)  BP: 154/102 (15 Mar 2023 15:49) (119/78 - 158/100)  BP(mean): --  RR: 18 (15 Mar 2023 15:49) (18 - 18)  SpO2: 94% (15 Mar 2023 15:49) (92% - 97%)    Parameters below as of 15 Mar 2023 15:49  Patient On (Oxygen Delivery Method): room air    
Vital Signs Last 24 Hrs  T(C): 36.9 (21 Mar 2023 10:11), Max: 36.9 (20 Mar 2023 16:25)  T(F): 98.5 (21 Mar 2023 10:11), Max: 98.5 (20 Mar 2023 16:25)  HR: 85 (21 Mar 2023 10:11) (79 - 99)  BP: 122/79 (21 Mar 2023 10:11) (105/79 - 129/85)  BP(mean): --  RR: 18 (21 Mar 2023 10:11) (18 - 18)  SpO2: 97% (21 Mar 2023 10:11) (95% - 100%)    Parameters below as of 21 Mar 2023 10:11  Patient On (Oxygen Delivery Method): room air

## 2023-03-22 NOTE — CHART NOTE - NSCHARTNOTEFT_GEN_A_CORE
Left a message for patient in regards to follow up care with callback information.
Left 1 message for patient in regards to follow up care with callback information.

## 2023-03-26 PROBLEM — F19.90 OTHER PSYCHOACTIVE SUBSTANCE USE, UNSPECIFIED, UNCOMPLICATED: Chronic | Status: ACTIVE | Noted: 2023-03-14

## 2023-04-28 ENCOUNTER — OUTPATIENT (OUTPATIENT)
Dept: OUTPATIENT SERVICES | Facility: HOSPITAL | Age: 38
LOS: 1 days | Discharge: HOME | End: 2023-04-28

## 2023-04-28 NOTE — ED PROVIDER NOTE - PROGRESS NOTE
I don't have room today, but I'd be willing to see her next week at any same day or provider only spot.   Improved.

## 2023-05-03 ENCOUNTER — TRANSCRIPTION ENCOUNTER (OUTPATIENT)
Age: 38
End: 2023-05-03

## 2023-05-03 ENCOUNTER — APPOINTMENT (OUTPATIENT)
Dept: HEPATOLOGY | Facility: CLINIC | Age: 38
End: 2023-05-03
Payer: COMMERCIAL

## 2023-05-03 VITALS
DIASTOLIC BLOOD PRESSURE: 87 MMHG | SYSTOLIC BLOOD PRESSURE: 131 MMHG | BODY MASS INDEX: 30.51 KG/M2 | TEMPERATURE: 98.6 F | HEART RATE: 75 BPM | RESPIRATION RATE: 16 BRPM | HEIGHT: 69 IN | WEIGHT: 206 LBS | OXYGEN SATURATION: 96 %

## 2023-05-03 DIAGNOSIS — F32.A DEPRESSION, UNSPECIFIED: ICD-10-CM

## 2023-05-03 DIAGNOSIS — F10.20 ALCOHOL DEPENDENCE, UNCOMPLICATED: ICD-10-CM

## 2023-05-03 DIAGNOSIS — F41.9 ANXIETY DISORDER, UNSPECIFIED: ICD-10-CM

## 2023-05-03 PROCEDURE — 99204 OFFICE O/P NEW MOD 45 MIN: CPT

## 2023-05-03 PROCEDURE — 99214 OFFICE O/P EST MOD 30 MIN: CPT

## 2023-05-04 NOTE — HISTORY OF PRESENT ILLNESS
[de-identified] : 37M w/pmh alcohol use disorder and anxiety who presents for f/u after recent admission for alcohol withdrawal with findings consistent for alcoholic hepatitis (DF 31, not given steroids). Patient had been hospitalized for alcohol once before, but has never been intubated or had withdrawal seizure. MR in hospital showed Enlarged liver with cirrhotic morphology. Hepatic steatosis. Geographic areas of arterial phase hyperenhancement without washout or diffusion restriction within the right hepatic lobe and adjacent to gallbladder fossa, likely related to known acute hepatitis. No evidence of HCC.\par \par Patient has been sober for 50 days now (3/13), and he is currently staying in a sober house. Patient states he has cravings here and there. Patient goes to daily AA meetings. Patient is starting tx program at [facility] (psychiatrist, therapist, and two meetings a week). He has tried acamprosate and oral naltrexone in the past which didn’t work.  Has lost 30 lbs since discharge. currently denies N/V, fever, chills, diarrhea, melena, hematochezia. His support at home includes his brother, parents, and his wife to some degree. He is currently working on marital problems at home. 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

## 2023-05-04 NOTE — PHYSICAL EXAM
[General Appearance - Alert] : alert [General Appearance - In No Acute Distress] : in no acute distress [Sclera] : the sclera and conjunctiva were normal [Extraocular Movements] : extraocular movements were intact [Outer Ear] : the ears and nose were normal in appearance [Both Tympanic Membranes Were Examined] : both tympanic membranes were normal [Neck Appearance] : the appearance of the neck was normal [] : no respiratory distress [Exaggerated Use Of Accessory Muscles For Inspiration] : no accessory muscle use [Apical Impulse] : the apical impulse was normal [Heart Sounds] : normal S1 and S2 [Edema] : there was no peripheral edema [Veins - Varicosity Changes] : there were no varicosital changes [Abdomen Soft] : soft [Abdomen Tenderness] : non-tender [Abdomen Mass (___ Cm)] : no abdominal mass palpated [No CVA Tenderness] : no ~M costovertebral angle tenderness [Abnormal Walk] : normal gait [Musculoskeletal - Swelling] : no joint swelling seen [Cranial Nerves] : cranial nerves 2-12 were intact [Deep Tendon Reflexes (DTR)] : deep tendon reflexes were 2+ and symmetric [Oriented To Time, Place, And Person] : oriented to person, place, and time [Impaired Insight] : insight and judgment were intact [Scleral Icterus] : No Scleral Icterus [Spider Angioma] : No spider angioma(s) were observed [Abdominal  Ascites] : no ascites [Caput Medusae] : no caput medusae observed [Asterixis] : no asterixis observed [Jaundice] : No jaundice [Depression] : no depression [Hallucinations] : ~T no ~M hallucinations

## 2023-05-04 NOTE — ASSESSMENT
[FreeTextEntry1] : 37M w/pmh alcohol use disorder and anxiety who presents for f/u after recent admission for alcohol withdrawal with findings consistent for alcoholic hepatitis (DF 31, not given steroids). Patient had been hospitalized for alcohol once before, but has never been intubated or had withdrawal seizure. MR in hospital showed Enlarged liver with cirrhotic morphology. Hepatic steatosis. Geographic areas of arterial phase hyperenhancement without washout or diffusion restriction within the right hepatic lobe and adjacent to gallbladder fossa, likely related to known acute hepatitis. No evidence of HCC.\par \par #EtOH Hepatitis - no need for steroids\par -patient is 50 days sober, will repeat blood work today including CBC,CMP, coags, PETH\par -nutritional support- low sodium, high protein diet\par -patient counseled on necessity for continued alcohol cessation, planned to join treatment program in [place]. discussed use of acamprosate and Vivitrol moving forward \par -RTC in 2 months will obtain fibroscan at that time \par  \par Discussed natural history of cirrhosis with patient\par Discussed salt restriction and abstinence from ETOH\par Stressed importance of close follow up - including regular interval labs and screening for HCC\par \par

## 2023-05-05 ENCOUNTER — TRANSCRIPTION ENCOUNTER (OUTPATIENT)
Age: 38
End: 2023-05-05

## 2023-05-05 LAB
ALBUMIN SERPL ELPH-MCNC: 4.8 G/DL
ALP BLD-CCNC: 87 U/L
ALT SERPL-CCNC: 42 U/L
ANION GAP SERPL CALC-SCNC: 12 MMOL/L
AST SERPL-CCNC: 34 U/L
BASOPHILS # BLD AUTO: 0.17 K/UL
BASOPHILS NFR BLD AUTO: 2 %
BILIRUB SERPL-MCNC: 0.9 MG/DL
BUN SERPL-MCNC: 8 MG/DL
CALCIUM SERPL-MCNC: 10.3 MG/DL
CHLORIDE SERPL-SCNC: 101 MMOL/L
CO2 SERPL-SCNC: 27 MMOL/L
CREAT SERPL-MCNC: 0.97 MG/DL
EGFR: 104 ML/MIN/1.73M2
EOSINOPHIL # BLD AUTO: 0.55 K/UL
EOSINOPHIL NFR BLD AUTO: 6.6 %
GLUCOSE SERPL-MCNC: 96 MG/DL
HCT VFR BLD CALC: 45.8 %
HGB BLD-MCNC: 14.9 G/DL
IMM GRANULOCYTES NFR BLD AUTO: 0.1 %
INR PPP: 1.17 RATIO
LYMPHOCYTES # BLD AUTO: 3.16 K/UL
LYMPHOCYTES NFR BLD AUTO: 38 %
MAN DIFF?: NORMAL
MCHC RBC-ENTMCNC: 31.2 PG
MCHC RBC-ENTMCNC: 32.5 GM/DL
MCV RBC AUTO: 95.8 FL
MONOCYTES # BLD AUTO: 0.58 K/UL
MONOCYTES NFR BLD AUTO: 7 %
NEUTROPHILS # BLD AUTO: 3.85 K/UL
NEUTROPHILS NFR BLD AUTO: 46.3 %
PLATELET # BLD AUTO: 273 K/UL
POTASSIUM SERPL-SCNC: 4.8 MMOL/L
PROT SERPL-MCNC: 7.7 G/DL
PT BLD: 13.7 SEC
RBC # BLD: 4.78 M/UL
RBC # FLD: 12.2 %
SODIUM SERPL-SCNC: 140 MMOL/L
WBC # FLD AUTO: 8.32 K/UL

## 2023-05-15 LAB — PHOSPHATIDYETHANOL (PETH), WHOLE BLOOD: NEGATIVE

## 2023-07-19 ENCOUNTER — APPOINTMENT (OUTPATIENT)
Dept: HEPATOLOGY | Facility: CLINIC | Age: 38
End: 2023-07-19
Payer: COMMERCIAL

## 2023-07-19 ENCOUNTER — RESULT REVIEW (OUTPATIENT)
Age: 38
End: 2023-07-19

## 2023-07-19 VITALS
SYSTOLIC BLOOD PRESSURE: 124 MMHG | HEIGHT: 69 IN | HEART RATE: 64 BPM | DIASTOLIC BLOOD PRESSURE: 81 MMHG | OXYGEN SATURATION: 100 % | RESPIRATION RATE: 14 BRPM | WEIGHT: 200 LBS | TEMPERATURE: 98 F | BODY MASS INDEX: 29.62 KG/M2

## 2023-07-19 DIAGNOSIS — Z87.19 PERSONAL HISTORY OF OTHER DISEASES OF THE DIGESTIVE SYSTEM: ICD-10-CM

## 2023-07-19 PROCEDURE — 99214 OFFICE O/P EST MOD 30 MIN: CPT | Mod: 25

## 2023-07-19 PROCEDURE — 76981 USE PARENCHYMA: CPT

## 2023-07-20 NOTE — PHYSICAL EXAM
[Irregular] : irregular [Scleral Icterus] : No Scleral Icterus [Spider Angioma] : No spider angioma(s) were observed [Abdominal Bruit] : no abdominal bruit [Abdominal  Ascites] : no ascites [Splenomegaly] : no splenomegaly [Caput Medusae] : no caput medusae observed [Asterixis] : no asterixis observed [Jaundice] : No jaundice [Palmar Erythema] : no Palmar Erythema [General Appearance - Alert] : alert [General Appearance - In No Acute Distress] : in no acute distress [Sclera] : the sclera and conjunctiva were normal [PERRL With Normal Accommodation] : pupils were equal in size, round, and reactive to light [Extraocular Movements] : extraocular movements were intact [Outer Ear] : the ears and nose were normal in appearance [Oropharynx] : the oropharynx was normal [Neck Appearance] : the appearance of the neck was normal [Neck Cervical Mass (___cm)] : no neck mass was observed [Jugular Venous Distention Increased] : there was no jugular-venous distention [Thyroid Diffuse Enlargement] : the thyroid was not enlarged [Thyroid Nodule] : there were no palpable thyroid nodules [Auscultation Breath Sounds / Voice Sounds] : lungs were clear to auscultation bilaterally [Heart Rate And Rhythm] : heart rate was normal and rhythm regular [Heart Sounds] : normal S1 and S2 [Heart Sounds Gallop] : no gallops [Murmurs] : no murmurs [Heart Sounds Pericardial Friction Rub] : no pericardial rub [Bowel Sounds] : normal bowel sounds [Abdomen Soft] : soft [Abdomen Tenderness] : non-tender [Abdomen Mass (___ Cm)] : no abdominal mass palpated [Abnormal Walk] : normal gait [Nail Clubbing] : no clubbing  or cyanosis of the fingernails [Musculoskeletal - Swelling] : no joint swelling seen [Motor Tone] : muscle strength and tone were normal [Skin Color & Pigmentation] : normal skin color and pigmentation [Skin Turgor] : normal skin turgor [] : no rash [Sensation] : the sensory exam was normal to light touch and pinprick [Deep Tendon Reflexes (DTR)] : deep tendon reflexes were 2+ and symmetric [No Focal Deficits] : no focal deficits [Impaired Insight] : insight and judgment were intact [Oriented To Time, Place, And Person] : oriented to person, place, and time [Affect] : the affect was normal

## 2023-07-20 NOTE — REVIEW OF SYSTEMS
[Fever] : no fever [Chills] : no chills [Chest Pain] : no chest pain [Palpitations] : no palpitations [Abdominal Pain] : no abdominal pain [Constipation] : no constipation [Diarrhea] : no diarrhea [Confused] : no confusion [Limb Weakness] : no limb weakness [Easy Bruising] : no tendency for easy bruising

## 2023-07-20 NOTE — HISTORY OF PRESENT ILLNESS
[de-identified] : 37M w/pmh alcohol use disorder and anxiety who presents for f/u fibroscan. PMHx includes recent admission for alcohol withdrawal with findings consistent for alcoholic hepatitis (DF 31, not given steroids). Patient had been hospitalized for alcohol once before, but has never been intubated or had withdrawal seizure. MR in hospital showed enlarged liver with cirrhotic morphology  and hepatic steatosis. Geographic areas of arterial phase hyperenhancement without washout or diffusion restriction within the right hepatic lobe and adjacent to gallbladder fossa, likely related to known acute hepatitis. No evidence of HCC.\par \par Patient has not had any alcohol since last visit and is currently attending an outpatient program that includes two men's group meetings each week as well as meeting with a therapist. Patient goes to daily AA meetings.He has tried acamprosate and oral naltrexone in the past which didn’t work.  He has lost 30 lbs since discharge. His support at home includes his brother, parents, and his wife to some degree. He is currently working on marital problems at home. \par  \par Interval hx: Pt. would like to go over his fibroscan. He would also like to discuss the natural progression of alcohol induced liver disease. No changes to medical history or surgical history. No new medications since last visit. Pt. does not endorse any fevers, abdominal pain, jaundice, scleral icterus, confusion, or swelling of the abd/extremities

## 2023-07-21 PROBLEM — Z87.19 HISTORY OF ACUTE ALCOHOLIC HEPATITIS: Status: ACTIVE | Noted: 2023-05-03

## 2023-09-28 ENCOUNTER — TRANSCRIPTION ENCOUNTER (OUTPATIENT)
Age: 38
End: 2023-09-28

## 2023-09-28 LAB
ALBUMIN SERPL ELPH-MCNC: 4.8 G/DL
ALP BLD-CCNC: 90 U/L
ALT SERPL-CCNC: 41 U/L
ANION GAP SERPL CALC-SCNC: 13 MMOL/L
AST SERPL-CCNC: 29 U/L
BILIRUB SERPL-MCNC: 0.4 MG/DL
BUN SERPL-MCNC: 12 MG/DL
CALCIUM SERPL-MCNC: 9.8 MG/DL
CHLORIDE SERPL-SCNC: 100 MMOL/L
CO2 SERPL-SCNC: 27 MMOL/L
CREAT SERPL-MCNC: 1.07 MG/DL
EGFR: 92 ML/MIN/1.73M2
GLUCOSE SERPL-MCNC: 88 MG/DL
INR PPP: 1.08 RATIO
POTASSIUM SERPL-SCNC: 4.1 MMOL/L
PROT SERPL-MCNC: 7.3 G/DL
PT BLD: 12.2 SEC
SODIUM SERPL-SCNC: 141 MMOL/L

## 2023-10-04 ENCOUNTER — OUTPATIENT (OUTPATIENT)
Dept: OUTPATIENT SERVICES | Facility: HOSPITAL | Age: 38
LOS: 1 days | End: 2023-10-04
Payer: COMMERCIAL

## 2023-10-04 ENCOUNTER — APPOINTMENT (OUTPATIENT)
Dept: ULTRASOUND IMAGING | Facility: CLINIC | Age: 38
End: 2023-10-04
Payer: COMMERCIAL

## 2023-10-04 DIAGNOSIS — Z87.19 PERSONAL HISTORY OF OTHER DISEASES OF THE DIGESTIVE SYSTEM: ICD-10-CM

## 2023-10-04 PROCEDURE — 76705 ECHO EXAM OF ABDOMEN: CPT | Mod: 26

## 2023-10-04 PROCEDURE — 76705 ECHO EXAM OF ABDOMEN: CPT

## 2023-10-09 ENCOUNTER — TRANSCRIPTION ENCOUNTER (OUTPATIENT)
Age: 38
End: 2023-10-09

## 2023-10-10 ENCOUNTER — APPOINTMENT (OUTPATIENT)
Dept: HEPATOLOGY | Facility: CLINIC | Age: 38
End: 2023-10-10

## 2023-11-01 ENCOUNTER — APPOINTMENT (OUTPATIENT)
Dept: HEPATOLOGY | Facility: CLINIC | Age: 38
End: 2023-11-01
Payer: COMMERCIAL

## 2023-11-01 VITALS
DIASTOLIC BLOOD PRESSURE: 86 MMHG | TEMPERATURE: 98.6 F | OXYGEN SATURATION: 97 % | BODY MASS INDEX: 31.1 KG/M2 | HEART RATE: 74 BPM | RESPIRATION RATE: 14 BRPM | HEIGHT: 69 IN | WEIGHT: 210 LBS | SYSTOLIC BLOOD PRESSURE: 137 MMHG

## 2023-11-01 PROCEDURE — 99213 OFFICE O/P EST LOW 20 MIN: CPT

## 2024-05-09 ENCOUNTER — APPOINTMENT (OUTPATIENT)
Dept: HEPATOLOGY | Facility: CLINIC | Age: 39
End: 2024-05-09
Payer: COMMERCIAL

## 2024-05-09 VITALS
TEMPERATURE: 98.6 F | RESPIRATION RATE: 14 BRPM | HEART RATE: 77 BPM | DIASTOLIC BLOOD PRESSURE: 94 MMHG | WEIGHT: 214 LBS | HEIGHT: 69 IN | BODY MASS INDEX: 31.7 KG/M2 | OXYGEN SATURATION: 99 % | SYSTOLIC BLOOD PRESSURE: 139 MMHG

## 2024-05-09 DIAGNOSIS — K70.30 ALCOHOLIC CIRRHOSIS OF LIVER W/OUT ASCITES: ICD-10-CM

## 2024-05-09 PROCEDURE — 99213 OFFICE O/P EST LOW 20 MIN: CPT

## 2024-05-09 RX ORDER — LORAZEPAM 1 MG/1
1 TABLET ORAL
Qty: 15 | Refills: 0 | Status: COMPLETED | COMMUNITY
Start: 2018-10-06 | End: 2024-05-09

## 2024-05-09 RX ORDER — ESCITALOPRAM OXALATE 10 MG/1
10 TABLET ORAL DAILY
Qty: 30 | Refills: 2 | Status: COMPLETED | COMMUNITY
Start: 2019-01-09 | End: 2024-05-09

## 2024-05-10 NOTE — PHYSICAL EXAM
[General Appearance - Alert] : alert [General Appearance - In No Acute Distress] : in no acute distress [Sclera] : the sclera and conjunctiva were normal [PERRL With Normal Accommodation] : pupils were equal in size, round, and reactive to light [Extraocular Movements] : extraocular movements were intact [Outer Ear] : the ears and nose were normal in appearance [Oropharynx] : the oropharynx was normal [Auscultation Breath Sounds / Voice Sounds] : lungs were clear to auscultation bilaterally [Heart Rate And Rhythm] : heart rate was normal and rhythm regular [Heart Sounds] : normal S1 and S2 [Heart Sounds Gallop] : no gallops [Murmurs] : no murmurs [Heart Sounds Pericardial Friction Rub] : no pericardial rub [Bowel Sounds] : normal bowel sounds [Abdomen Soft] : soft [Abdomen Tenderness] : non-tender [Abdomen Mass (___ Cm)] : no abdominal mass palpated [Skin Color & Pigmentation] : normal skin color and pigmentation [Skin Turgor] : normal skin turgor [] : no rash [No Focal Deficits] : no focal deficits [Oriented To Time, Place, And Person] : oriented to person, place, and time [Impaired Insight] : insight and judgment were intact [Affect] : the affect was normal

## 2024-05-10 NOTE — ASSESSMENT
[FreeTextEntry1] : 38M ETOH Cirrhosis compensated at this time Low MELD  Discussed natural history of cirrhosis with patient Discussed salt restriction and abstinence from ETOH Stressed importance of close follow up - including regular interval labs and screening for HCC  Schedule Labs Update MELD Schedule MRE  RTC 6 M Will call with results

## 2024-05-10 NOTE — HISTORY OF PRESENT ILLNESS
[FreeTextEntry1] : 38M w/pmh alcohol use disorder and anxiety who presents for f/u fibroscan. PMHx includes recent admission for alcohol withdrawal with findings consistent for alcoholic hepatitis (DF 31, not given steroids). Patient had been hospitalized for alcohol once before, but has never been intubated or had withdrawal seizure. MR in hospital showed enlarged liver with cirrhotic morphology  and hepatic steatosis. Geographic areas of arterial phase hyperenhancement without washout or diffusion restriction within the right hepatic lobe and adjacent to gallbladder fossa, likely related to known acute hepatitis. No evidence of HCC.  Patient has not had any alcohol since last visit and is currently attending an outpatient program that includes two men's group meetings each week as well as meeting with a therapist. Patient goes to daily AA meetings.He has tried acamprosate and oral naltrexone in the past which didn't work.  He has lost 30 lbs since discharge. His support at home includes his brother, parents, and his wife to some degree. He is currently working on marital problems at home.    Interval hx:  No signs of decompensation No evidence of GI bleeding or confusion Feels well without LE edema or abd distention Fibroscan with F4 disease  Patient needs MRE and updated labs Otherwise no other issues at this time and feels well

## 2024-05-13 LAB
HBV SURFACE AB SER QL: REACTIVE
HBV SURFACE AG SER QL: NONREACTIVE
HCV AB SER QL: NONREACTIVE
HCV S/CO RATIO: 0.12 S/CO
HEPATITIS A IGG ANTIBODY: NONREACTIVE

## 2024-07-26 NOTE — ED ADULT TRIAGE NOTE - NS_BH TRG QUESTION1_ED_ALL_ED
Patient: Amado Combs    Procedure Information       Date/Time: 07/26/24 1030    Scheduled providers: Immanuel Nava MD    Procedure: COLONOSCOPY    Location: [facility]            Relevant Problems   Anesthesia  Angry and combative after anesthesia   (+) Postoperative delirium       Clinical information reviewed:   Tobacco  Allergies  Meds   Med Hx  Surg Hx   Fam Hx  Soc Hx        NPO Detail:  NPO/Void Status  Date of Last Liquid: 07/26/24  Time of Last Liquid: 0430  Date of Last Solid: 07/24/24  Time of Last Solid: 1800  Last Intake Type: Clear fluids         Physical Exam    Airway  Mallampati: II  TM distance: >3 FB     Cardiovascular - normal exam     Dental - normal exam     Pulmonary - normal exam     Abdominal   (+) obese             Anesthesia Plan    History of general anesthesia?: yes  History of complications of general anesthesia?: yes    ASA 2     MAC     The patient is not a current smoker.    intravenous induction   Anesthetic plan and risks discussed with patient.    Plan discussed with CRNA.       No

## 2024-11-12 ENCOUNTER — APPOINTMENT (OUTPATIENT)
Dept: HEPATOLOGY | Facility: CLINIC | Age: 39
End: 2024-11-12

## 2025-05-06 NOTE — ASSESSMENT
[FreeTextEntry1] : 37M w/pmh alcohol use disorder and anxiety who presents for f/u after recent admission for alcohol withdrawal with findings consistent for alcoholic hepatitis (DF 31, not given steroids). Patient had been hospitalized for alcohol once before, but has never been intubated or had withdrawal seizure. MR in hospital showed Enlarged liver with cirrhotic morphology and hepatic steatosis.\par \par Fibroscan: CAP of 275, S2 grading, and E of 54.9, F4 grading\par \par We discussed the results of the fibroscan with the patient. We explained that F4 grading is consistent with cirrhosis, but nonspecific especially in the context of a hospitalization for  acute hepatitis 2/2 to alcohol use just months prior. Pt. told that to be more definitive about a diagnosis of cirrhosis, he should receive another ultrasound in 3 months as well as repeat blood work of  chemistries, liver enzymes ,and LFTs. We further explained that, while currently not indicated, a liver biopsy may be needed if ultrasound and bloodwork are equivocal or conflict with the results of fibroscan. \par \par We discussed cirrhosis of the liver and the natural course of the disease. We explained that cirrhosis is a pathological diagnosis and that it is impossible to predict whether all the changes seen in the liver are reversible despite the liver's capacity for regeneration. We provided education such that the patient understands that it is possible to have cirrhosis of the liver while liver enzymes remain WNL. We discussed indications for liver transplant and expressed that at this time we are cautiously optimistic that if patient continues to be abstinent from alcohol, he may never need a liver transplant. We discussed signs of decompensation as indications to seek immediate medical care. We emphasized that despite our conversation and the results of the fibroscan, we wish to repeat US imaging and blood work in 2 months before determining whether the patient definitively has cirrhosis.\par \par We discussed the risk of esophageal varices for patients with cirrhosis. Prophylactic beta blocker therapy was discussed with the patient, but given patient's vitals (HR 60), we discussed concerns that pt may not tolerate beta blocker therapy well. Currently, w/o further confirmation of cirrhosis, we will not start the patient on beta blocker therapy though we discussed with pt that he may eventually require beta blocker therapy if his liver does not demonstrate improvement on subsequent scans. \par \par -We commended the patient for attending outpatient therapy and AA meetings; counseling provided to continue abstinence from alcohol indefinitely\par -Salt restriction encouraged\par -RTC in 2 months; will obtain ultrasound at that time as well as repeat bloodwork\par -Close follow-up required for this patient, including regular interval labs and screening for HCC\par \par 
Her/She